# Patient Record
Sex: MALE | Race: BLACK OR AFRICAN AMERICAN | URBAN - METROPOLITAN AREA
[De-identification: names, ages, dates, MRNs, and addresses within clinical notes are randomized per-mention and may not be internally consistent; named-entity substitution may affect disease eponyms.]

---

## 2020-07-15 ENCOUNTER — APPOINTMENT (RX ONLY)
Dept: URBAN - METROPOLITAN AREA CLINIC 349 | Facility: CLINIC | Age: 35
Setting detail: DERMATOLOGY
End: 2020-07-15

## 2020-07-15 DIAGNOSIS — L70.0 ACNE VULGARIS: ICD-10-CM | Status: STABLE

## 2020-07-15 PROCEDURE — ? COUNSELING

## 2020-07-15 PROCEDURE — 99202 OFFICE O/P NEW SF 15 MIN: CPT

## 2020-07-15 PROCEDURE — ? PRESCRIPTION

## 2020-07-15 RX ORDER — ADAPALENE 3 MG/G
GEL TOPICAL
Qty: 1 | Refills: 5 | Status: ERX | COMMUNITY
Start: 2020-07-15

## 2020-07-15 RX ORDER — MINOCYCLINE 40 MG/G
AEROSOL, FOAM TOPICAL
Qty: 1 | Refills: 5 | Status: ERX | COMMUNITY
Start: 2020-07-15

## 2020-07-15 RX ADMIN — MINOCYCLINE: 40 AEROSOL, FOAM TOPICAL at 00:00

## 2020-07-15 RX ADMIN — ADAPALENE: 3 GEL TOPICAL at 00:00

## 2020-07-15 ASSESSMENT — LOCATION SIMPLE DESCRIPTION DERM
LOCATION SIMPLE: RIGHT CHEEK
LOCATION SIMPLE: LEFT CHEEK

## 2020-07-15 ASSESSMENT — LOCATION DETAILED DESCRIPTION DERM
LOCATION DETAILED: RIGHT INFERIOR CENTRAL MALAR CHEEK
LOCATION DETAILED: LEFT INFERIOR CENTRAL MALAR CHEEK

## 2020-07-15 ASSESSMENT — LOCATION ZONE DERM: LOCATION ZONE: FACE

## 2020-07-15 NOTE — PROCEDURE: COUNSELING
Include Pregnancy/Lactation Warning?: No
Minocycline Pregnancy And Lactation Text: This medication is Pregnancy Category D and not consider safe during pregnancy. It is also excreted in breast milk.
Spironolactone Counseling: Patient advised regarding risks of diarrhea, abdominal pain, hyperkalemia, birth defects (for female patients), liver toxicity and renal toxicity. The patient may need blood work to monitor liver and kidney function and potassium levels while on therapy. The patient verbalized understanding of the proper use and possible adverse effects of spironolactone.  All of the patient's questions and concerns were addressed.
Azithromycin Pregnancy And Lactation Text: This medication is considered safe during pregnancy and is also secreted in breast milk.
Isotretinoin Counseling: Patient should get monthly blood tests, not donate blood, not drive at night if vision affected, not share medication, and not undergo elective surgery for 6 months after tx completed. Side effects reviewed, pt to contact office should one occur.
Azithromycin Counseling:  I discussed with the patient the risks of azithromycin including but not limited to GI upset, allergic reaction, drug rash, diarrhea, and yeast infections.
Doxycycline Counseling:  Patient counseled regarding possible photosensitivity and increased risk for sunburn.  Patient instructed to avoid sunlight, if possible.  When exposed to sunlight, patients should wear protective clothing, sunglasses, and sunscreen.  The patient was instructed to call the office immediately if the following severe adverse effects occur:  hearing changes, easy bruising/bleeding, severe headache, or vision changes.  The patient verbalized understanding of the proper use and possible adverse effects of doxycycline.  All of the patient's questions and concerns were addressed.
Birth Control Pills Pregnancy And Lactation Text: This medication should be avoided if pregnant and for the first 30 days post-partum.
Topical Retinoid counseling:  Patient advised to apply a pea-sized amount only at bedtime and wait 30 minutes after washing their face before applying.  If too drying, patient may add a non-comedogenic moisturizer. The patient verbalized understanding of the proper use and possible adverse effects of retinoids.  All of the patient's questions and concerns were addressed.
Bactrim Pregnancy And Lactation Text: This medication is Pregnancy Category D and is known to cause fetal risk.  It is also excreted in breast milk.
Birth Control Pills Counseling: Birth Control Pill Counseling: I discussed with the patient the potential side effects of OCPs including but not limited to increased risk of stroke, heart attack, thrombophlebitis, deep venous thrombosis, hepatic adenomas, breast changes, GI upset, headaches, and depression.  The patient verbalized understanding of the proper use and possible adverse effects of OCPs. All of the patient's questions and concerns were addressed.
Benzoyl Peroxide Counseling: Patient counseled that medicine may cause skin irritation and bleach clothing.  In the event of skin irritation, the patient was advised to reduce the amount of the drug applied or use it less frequently.   The patient verbalized understanding of the proper use and possible adverse effects of benzoyl peroxide.  All of the patient's questions and concerns were addressed.
Bactrim Counseling:  I discussed with the patient the risks of sulfa antibiotics including but not limited to GI upset, allergic reaction, drug rash, diarrhea, dizziness, photosensitivity, and yeast infections.  Rarely, more serious reactions can occur including but not limited to aplastic anemia, agranulocytosis, methemoglobinemia, blood dyscrasias, liver or kidney failure, lung infiltrates or desquamative/blistering drug rashes.
Topical Clindamycin Pregnancy And Lactation Text: This medication is Pregnancy Category B and is considered safe during pregnancy. It is unknown if it is excreted in breast milk.
Minocycline Counseling: Patient advised regarding possible photosensitivity and discoloration of the teeth, skin, lips, tongue and gums.  Patient instructed to avoid sunlight, if possible.  When exposed to sunlight, patients should wear protective clothing, sunglasses, and sunscreen.  The patient was instructed to call the office immediately if the following severe adverse effects occur:  hearing changes, easy bruising/bleeding, severe headache, or vision changes.  The patient verbalized understanding of the proper use and possible adverse effects of minocycline.  All of the patient's questions and concerns were addressed.
Erythromycin Counseling:  I discussed with the patient the risks of erythromycin including but not limited to GI upset, allergic reaction, drug rash, diarrhea, increase in liver enzymes, and yeast infections.
Topical Sulfur Applications Pregnancy And Lactation Text: This medication is Pregnancy Category C and has an unknown safety profile during pregnancy. It is unknown if this topical medication is excreted in breast milk.
Tazorac Pregnancy And Lactation Text: This medication is not safe during pregnancy. It is unknown if this medication is excreted in breast milk.
Tazorac Counseling:  Patient advised that medication is irritating and drying.  Patient may need to apply sparingly and wash off after an hour before eventually leaving it on overnight.  The patient verbalized understanding of the proper use and possible adverse effects of tazorac.  All of the patient's questions and concerns were addressed.
Spironolactone Pregnancy And Lactation Text: This medication can cause feminization of the male fetus and should be avoided during pregnancy. The active metabolite is also found in breast milk.
Benzoyl Peroxide Pregnancy And Lactation Text: This medication is Pregnancy Category C. It is unknown if benzoyl peroxide is excreted in breast milk.
High Dose Vitamin A Pregnancy And Lactation Text: High dose vitamin A therapy is contraindicated during pregnancy and breast feeding.
Sarecycline Counseling: Patient advised regarding possible photosensitivity and discoloration of the teeth, skin, lips, tongue and gums.  Patient instructed to avoid sunlight, if possible.  When exposed to sunlight, patients should wear protective clothing, sunglasses, and sunscreen.  The patient was instructed to call the office immediately if the following severe adverse effects occur:  hearing changes, easy bruising/bleeding, severe headache, or vision changes.  The patient verbalized understanding of the proper use and possible adverse effects of sarecycline.  All of the patient's questions and concerns were addressed.
Doxycycline Pregnancy And Lactation Text: This medication is Pregnancy Category D and not consider safe during pregnancy. It is also excreted in breast milk but is considered safe for shorter treatment courses.
High Dose Vitamin A Counseling: Side effects reviewed, pt to contact office should one occur.
Dapsone Pregnancy And Lactation Text: This medication is Pregnancy Category C and is not considered safe during pregnancy or breast feeding.
Erythromycin Pregnancy And Lactation Text: This medication is Pregnancy Category B and is considered safe during pregnancy. It is also excreted in breast milk.
Topical Clindamycin Counseling: Patient counseled that this medication may cause skin irritation or allergic reactions.  In the event of skin irritation, the patient was advised to reduce the amount of the drug applied or use it less frequently.   The patient verbalized understanding of the proper use and possible adverse effects of clindamycin.  All of the patient's questions and concerns were addressed.
Topical Retinoid Pregnancy And Lactation Text: This medication is Pregnancy Category C. It is unknown if this medication is excreted in breast milk.
Detail Level: Zone
Topical Sulfur Applications Counseling: Topical Sulfur Counseling: Patient counseled that this medication may cause skin irritation or allergic reactions.  In the event of skin irritation, the patient was advised to reduce the amount of the drug applied or use it less frequently.   The patient verbalized understanding of the proper use and possible adverse effects of topical sulfur application.  All of the patient's questions and concerns were addressed.
Isotretinoin Pregnancy And Lactation Text: This medication is Pregnancy Category X and is considered extremely dangerous during pregnancy. It is unknown if it is excreted in breast milk.
Tetracycline Counseling: Patient counseled regarding possible photosensitivity and increased risk for sunburn.  Patient instructed to avoid sunlight, if possible.  When exposed to sunlight, patients should wear protective clothing, sunglasses, and sunscreen.  The patient was instructed to call the office immediately if the following severe adverse effects occur:  hearing changes, easy bruising/bleeding, severe headache, or vision changes.  The patient verbalized understanding of the proper use and possible adverse effects of tetracycline.  All of the patient's questions and concerns were addressed. Patient understands to avoid pregnancy while on therapy due to potential birth defects.
Dapsone Counseling: I discussed with the patient the risks of dapsone including but not limited to hemolytic anemia, agranulocytosis, rashes, methemoglobinemia, kidney failure, peripheral neuropathy, headaches, GI upset, and liver toxicity.  Patients who start dapsone require monitoring including baseline LFTs and weekly CBCs for the first month, then every month thereafter.  The patient verbalized understanding of the proper use and possible adverse effects of dapsone.  All of the patient's questions and concerns were addressed.

## 2022-03-18 PROBLEM — R73.01 IMPAIRED FASTING GLUCOSE: Status: ACTIVE | Noted: 2021-03-25

## 2022-03-18 PROBLEM — J35.1 ENLARGED TONSILS: Status: ACTIVE | Noted: 2017-11-10

## 2022-03-18 PROBLEM — J30.9 ACUTE ALLERGIC RHINITIS: Status: ACTIVE | Noted: 2017-11-10

## 2022-03-18 PROBLEM — I34.0 TRACE MITRAL REGURGITATION BY PRIOR ECHOCARDIOGRAM: Status: ACTIVE | Noted: 2018-03-27

## 2022-03-19 PROBLEM — R00.1 BRADYCARDIA, SINUS: Status: ACTIVE | Noted: 2017-11-10

## 2022-03-19 PROBLEM — R61 NIGHT SWEATS: Status: ACTIVE | Noted: 2017-11-10

## 2022-03-19 PROBLEM — I07.1 TRACE TRICUSPID REGURGITATION BY PRIOR ECHOCARDIOGRAM: Status: ACTIVE | Noted: 2018-03-27

## 2022-03-19 PROBLEM — L70.0 ACNE VULGARIS: Status: ACTIVE | Noted: 2020-08-03

## 2022-03-19 PROBLEM — R01.1 MURMUR, CARDIAC: Status: ACTIVE | Noted: 2018-01-23

## 2022-04-12 PROBLEM — R61 NIGHT SWEATS: Status: RESOLVED | Noted: 2017-11-10 | Resolved: 2022-04-12

## 2022-04-12 PROBLEM — R00.1 BRADYCARDIA, SINUS: Status: RESOLVED | Noted: 2017-11-10 | Resolved: 2022-04-12

## 2022-07-20 ENCOUNTER — NURSE TRIAGE (OUTPATIENT)
Dept: OTHER | Facility: CLINIC | Age: 37
End: 2022-07-20

## 2022-07-20 ENCOUNTER — TELEPHONE (OUTPATIENT)
Dept: INTERNAL MEDICINE CLINIC | Facility: CLINIC | Age: 37
End: 2022-07-20

## 2022-07-20 NOTE — TELEPHONE ENCOUNTER
Caller seen in [address] yesterday. Calling for a follow up. ECC agent Lobito still on the line and able to take the call back to schedule. Reason for Disposition   Caller has already spoken with another triager and has no further questions.     Protocols used: No Contact or Duplicate Contact Call-ADULT-

## 2022-07-20 NOTE — TELEPHONE ENCOUNTER
----- Message from Brain Larson sent at 7/20/2022  8:34 AM EDT -----  Subject: Message to Provider    QUESTIONS  Information for Provider? Patient passed out on 07/19/2022 and had to go   to the urgent care. The patient would like to do a follow up as soon as   possible. If you have any cancelations please call the patient to come.   ---------------------------------------------------------------------------  --------------  [id number] [employer]  [phone]; OK to leave message on voicemail  ---------------------------------------------------------------------------  --------------  SCRIPT ANSWERS  Relationship to Patient?  Self

## 2022-08-10 ENCOUNTER — OFFICE VISIT (OUTPATIENT)
Dept: INTERNAL MEDICINE CLINIC | Facility: CLINIC | Age: 37
End: 2022-08-10
Payer: COMMERCIAL

## 2022-08-10 VITALS
BODY MASS INDEX: 25.16 KG/M2 | OXYGEN SATURATION: 99 % | RESPIRATION RATE: 12 BRPM | DIASTOLIC BLOOD PRESSURE: 88 MMHG | HEART RATE: 58 BPM | WEIGHT: 166 LBS | HEIGHT: 68 IN | SYSTOLIC BLOOD PRESSURE: 138 MMHG

## 2022-08-10 DIAGNOSIS — R01.1 MURMUR, CARDIAC: ICD-10-CM

## 2022-08-10 DIAGNOSIS — R55 SYNCOPE, UNSPECIFIED SYNCOPE TYPE: Primary | ICD-10-CM

## 2022-08-10 DIAGNOSIS — I07.1 TRACE TRICUSPID REGURGITATION BY PRIOR ECHOCARDIOGRAM: ICD-10-CM

## 2022-08-10 DIAGNOSIS — R68.89 ALTERATION OF BODY TEMPERATURE: ICD-10-CM

## 2022-08-10 DIAGNOSIS — R35.0 URINARY FREQUENCY: ICD-10-CM

## 2022-08-10 DIAGNOSIS — R73.01 IMPAIRED FASTING GLUCOSE: ICD-10-CM

## 2022-08-10 DIAGNOSIS — R00.1 BRADYCARDIA: ICD-10-CM

## 2022-08-10 DIAGNOSIS — E55.9 VITAMIN D DEFICIENCY, UNSPECIFIED: ICD-10-CM

## 2022-08-10 DIAGNOSIS — G44.52 NPDH (NEW PERSISTENT DAILY HEADACHE): ICD-10-CM

## 2022-08-10 LAB
25(OH)D3 SERPL-MCNC: 63.6 NG/ML (ref 30–100)
ALBUMIN SERPL-MCNC: 3.9 G/DL (ref 3.5–5)
ALBUMIN/GLOB SERPL: 1.1 {RATIO} (ref 1.2–3.5)
ALP SERPL-CCNC: 79 U/L (ref 50–136)
ALT SERPL-CCNC: 32 U/L (ref 12–65)
ANION GAP SERPL CALC-SCNC: 4 MMOL/L (ref 7–16)
AST SERPL-CCNC: 12 U/L (ref 15–37)
BASOPHILS # BLD: 0.1 K/UL (ref 0–0.2)
BASOPHILS NFR BLD: 1 % (ref 0–2)
BILIRUB SERPL-MCNC: 0.3 MG/DL (ref 0.2–1.1)
BILIRUBIN, URINE, POC: NEGATIVE
BLOOD URINE, POC: NEGATIVE
BUN SERPL-MCNC: 17 MG/DL (ref 6–23)
CALCIUM SERPL-MCNC: 9.8 MG/DL (ref 8.3–10.4)
CHLORIDE SERPL-SCNC: 106 MMOL/L (ref 98–107)
CO2 SERPL-SCNC: 28 MMOL/L (ref 21–32)
CREAT SERPL-MCNC: 1.1 MG/DL (ref 0.8–1.5)
DIFFERENTIAL METHOD BLD: NORMAL
EOSINOPHIL # BLD: 0.2 K/UL (ref 0–0.8)
EOSINOPHIL NFR BLD: 3 % (ref 0.5–7.8)
ERYTHROCYTE [DISTWIDTH] IN BLOOD BY AUTOMATED COUNT: 12.9 % (ref 11.9–14.6)
EST. AVERAGE GLUCOSE BLD GHB EST-MCNC: 120 MG/DL
GLOBULIN SER CALC-MCNC: 3.5 G/DL (ref 2.3–3.5)
GLUCOSE SERPL-MCNC: 96 MG/DL (ref 65–100)
GLUCOSE URINE, POC: NEGATIVE
HBA1C MFR BLD: 5.8 % (ref 4.8–5.6)
HCT VFR BLD AUTO: 47.2 % (ref 41.1–50.3)
HGB BLD-MCNC: 15.1 G/DL (ref 13.6–17.2)
IMM GRANULOCYTES # BLD AUTO: 0 K/UL (ref 0–0.5)
IMM GRANULOCYTES NFR BLD AUTO: 0 % (ref 0–5)
KETONES, URINE, POC: NEGATIVE
LEUKOCYTE ESTERASE, URINE, POC: NEGATIVE
LYMPHOCYTES # BLD: 2.4 K/UL (ref 0.5–4.6)
LYMPHOCYTES NFR BLD: 36 % (ref 13–44)
MCH RBC QN AUTO: 30.1 PG (ref 26.1–32.9)
MCHC RBC AUTO-ENTMCNC: 32 G/DL (ref 31.4–35)
MCV RBC AUTO: 94 FL (ref 79.6–97.8)
MONOCYTES # BLD: 0.6 K/UL (ref 0.1–1.3)
MONOCYTES NFR BLD: 9 % (ref 4–12)
NEUTS SEG # BLD: 3.4 K/UL (ref 1.7–8.2)
NEUTS SEG NFR BLD: 51 % (ref 43–78)
NITRITE, URINE, POC: NEGATIVE
NRBC # BLD: 0 K/UL (ref 0–0.2)
PH, URINE, POC: 7 (ref 4.6–8)
PLATELET # BLD AUTO: 275 K/UL (ref 150–450)
PMV BLD AUTO: 10.3 FL (ref 9.4–12.3)
POTASSIUM SERPL-SCNC: 4.8 MMOL/L (ref 3.5–5.1)
PROT SERPL-MCNC: 7.4 G/DL (ref 6.3–8.2)
PROTEIN,URINE, POC: NEGATIVE
RBC # BLD AUTO: 5.02 M/UL (ref 4.23–5.6)
SODIUM SERPL-SCNC: 138 MMOL/L (ref 138–145)
SPECIFIC GRAVITY, URINE, POC: 1.01 (ref 1–1.03)
TSH, 3RD GENERATION: 0.76 UIU/ML (ref 0.36–3.74)
URINALYSIS CLARITY, POC: CLEAR
URINALYSIS COLOR, POC: YELLOW
UROBILINOGEN, POC: NORMAL
WBC # BLD AUTO: 6.8 K/UL (ref 4.3–11.1)

## 2022-08-10 PROCEDURE — 99214 OFFICE O/P EST MOD 30 MIN: CPT | Performed by: INTERNAL MEDICINE

## 2022-08-10 PROCEDURE — 93000 ELECTROCARDIOGRAM COMPLETE: CPT | Performed by: INTERNAL MEDICINE

## 2022-08-10 PROCEDURE — 81003 URINALYSIS AUTO W/O SCOPE: CPT | Performed by: INTERNAL MEDICINE

## 2022-08-10 ASSESSMENT — PATIENT HEALTH QUESTIONNAIRE - PHQ9
SUM OF ALL RESPONSES TO PHQ QUESTIONS 1-9: 0
2. FEELING DOWN, DEPRESSED OR HOPELESS: 0
SUM OF ALL RESPONSES TO PHQ QUESTIONS 1-9: 0
SUM OF ALL RESPONSES TO PHQ9 QUESTIONS 1 & 2: 0
SUM OF ALL RESPONSES TO PHQ QUESTIONS 1-9: 0
SUM OF ALL RESPONSES TO PHQ QUESTIONS 1-9: 0
1. LITTLE INTEREST OR PLEASURE IN DOING THINGS: 0

## 2022-08-10 NOTE — PROGRESS NOTES
8/10/2022    Chief Complaint   Patient presents with    Annual Exam    Follow-Up from Hospital     Was seen in hospital 7/19/22       Assessment and plan     1. Syncope, unspecified syncope type  -     EKG 12 Lead; Future  -     CBC with Auto Differential; Future  -     [facility]  -     Ambulatory referral to Cardiology  2. Impaired fasting glucose  -     Comprehensive Metabolic Panel; Future  -     CBC  -     Hemoglobin A1C  3. Murmur, cardiac  -     EKG 12 Lead; Future  -     CBC  -     Ambulatory referral to Cardiology  4. Trace tricuspid regurgitation by prior echocardiogram  -     EKG 12 Lead; Future  -     Ambulatory referral to Cardiology  5. NPD[facility] (new persistent daily headache)  -     MRI BRAIN W WO CONTRAST; Future  -     24 [facility] [address]  6. Alteration of body temperature  -     TSH; Future  7. Urinary frequency  -     AMB POC URINALYSIS DIP STICK AUTO W/O MICRO  8. Vitamin D deficiency, unspecified  -     Vitamin D 25 Hydroxy  9. Bradycardia  -     Ambulatory referral to Cardiology     Stable. He has not had any syncopal episodes since last.  Discussed current management. Continue with regular activities avoid straining lifting pushing or pulling. He is advised to seek medical attention for any recurrent symptoms. No neurologic deficits associated with headaches . Today's physical examination was normal.  He would need to follow-up with cardiologist.  Office will refer him again for evaluation for syncope. follow up   Return in about 5 weeks (around 9/14/2022) for Physical.         Subjective           HPI :    This is an urgent care  follow-up visit. Patient seen in the Urgent care at [facility] on July 19 after having an episode of syncope. He describes doing squats 150 pounds which is much less that he would usually use up felt lightheaded and blacked out for a few seconds. Episode was witnessed by other members of the gym.   He recovered spontaneously without any mental confusion or evidence of seizure activity. Since that time he has had no other episodes. No changes in energy level stamina. Denies having any chest pains or shortness of breath. Notes that his pulse is normal a bit higher than it used to be. Baseline pulse is in the 40s currently his pulse baseline is in the 50s. Records from [facility] reviewed. Headaches  For almost 1 month prior to his syncopal episode he says has been having dull headaches at the top of his head. Headaches are present almost all day. No known precipitating or relieving factors. No associated nausea visual changes, hearing changes, or  weakness numbness or tingling. Nonspecific symptoms  States that he thinks that he is unable to regulate his temperature. Describes having episodes of feeling hot and cold for no reason . Says that there has been changes in the regulation of his bowel movements and urination. Says he is to be like clockwork now had bowel movements and increased urination at different times. Thinks that he has retained water. Not sleeping as much as he used to. Appetite is good. Mood is good. Review of Systems  Occasional metallic taste    Objective     Examination  /88 (Site: Left Upper Arm, Position: Sitting)   Pulse 58   Resp 12   Ht 5' 8\" (1.727 m)   Wt 166 lb (75.3 kg)   SpO2 99%   BMI 25.24 kg/m²     Physical Exam  General appearance:Well looking , No distress Alert and oriented X 3. Well nourished and well hydrated  Head: Normocephalic, atraumatic. O scalp tenderness  Eyes: conjunctivae clear. Neck: Supple, No carotid bruit, no thyromegaly, no adenopathy  Resp: normal effort. Chest clear  Heart: heart sounds were very soft . Slow rate . Kandy Remedies Abdomen: Soft, non-tender, no masses , no organomegaly. Normal bowel sounds  Extremities: No edema  Neurology: no Focal deficits  Psychology: normal affect.  Mood is normal     EKG  Marked sinus  Bradycardia  rate 48 bpm MT interval 182. Normal Axis. NO ST- T abnormality   - occasional PAC     # PACs = 1.     Leslie Burton MD FACP

## 2022-08-12 ENCOUNTER — INITIAL CONSULT (OUTPATIENT)
Dept: CARDIOLOGY CLINIC | Age: 37
End: 2022-08-12
Payer: COMMERCIAL

## 2022-08-12 VITALS
HEIGHT: 68 IN | DIASTOLIC BLOOD PRESSURE: 80 MMHG | HEART RATE: 44 BPM | SYSTOLIC BLOOD PRESSURE: 120 MMHG | BODY MASS INDEX: 25.52 KG/M2 | WEIGHT: 168.4 LBS

## 2022-08-12 DIAGNOSIS — R55 VASOVAGAL SYNCOPE: Primary | ICD-10-CM

## 2022-08-12 DIAGNOSIS — R51.9 ACHING HEADACHE: ICD-10-CM

## 2022-08-12 PROCEDURE — 93000 ELECTROCARDIOGRAM COMPLETE: CPT | Performed by: INTERNAL MEDICINE

## 2022-08-12 PROCEDURE — 99204 OFFICE O/P NEW MOD 45 MIN: CPT | Performed by: INTERNAL MEDICINE

## 2022-08-12 ASSESSMENT — ENCOUNTER SYMPTOMS: SHORTNESS OF BREATH: 0

## 2022-08-12 NOTE — PROGRESS NOTES
[facility] CARDIOLOGY  [address], [address]  PHONE: [phone]      22    NAME:  Yas Mac  : 1985  MRN: [id number]         SUBJECTIVE:   Yas Mac is a 31 y.o. male seen for a consultation visit regarding the following:     Chief Complaint   Patient presents with    Consultation    Heart Murmur            HPI:  Consultation is requested by Marilia Eduardo MD for evaluation of Consultation and Heart Murmur   . Consult syncope. One and only episode . He got up at 430 to be at work at 65, had has usual breakfast and green tea, worked per usual, went to the gym ~ 4 that afternoon. Lifting much lighter weights as usual and doing lighter squats than usual during his warm up. Stood up, started to feel poorly, took about 5 steps and passed out. He did eat normally that day, high protein (lean) diet as usual, took his pre workout supplement which is the same he's been taking for a decade, no additional supplements and minimal caffeine, just a couple cups green tea in the morning. Very good about his water. Witnessed by 3 others, was out about 10 seconds, no loss of bowel or bladder control, went to urgent care where everything checked out ok. The following week, back to his usual routine, his 3rd workout that week he started to feel that way again after completing his exercise. Didn't pass out, just had to lie down for about 5 minutes and it resolved. The following week, he stopped his green tea altogether and resumed his workout without a problem. He has no symptoms between episodes and in fact is very healthy    His biological mother and oldest brother have hypertension and DM. He has an identical twin in the [employer] and they are both very healthy and athletic. Patient works as an .       He has been suffering with dull headaches for several months, initially attributed to allergies and started taking (plain) Allegra ~ 4 months ago. HA's have persisted and worsened and are now present 24/7, associated with feeling cold much of the time which is new for him. His PCP is working up his headaches. He saw Dr Abigail Prakash ~ 5 years ago with resting bradycardia in the high 30's, normal echo, asymptomatic and attributed to high vagal tone in this athlete. PAST CARDIAC HISTORY:  2018- Echo - normal wall thickness, SF, DF and valves          Past Medical History, Past Surgical History, Family history, Social History, and Medications were all reviewed with the patient today and updated as necessary. Prior to Admission medications    Medication Sig Start Date End Date Taking? Authorizing Provider   Multiple Vitamin (MULTIVITAMIN PO) Take by mouth   Yes Ar Automatic Reconciliation   olopatadine (PATADAY) 0.2 % SOLN ophthalmic solution Apply 1 drop to eye daily   Yes Ar Automatic Reconciliation     No Known Allergies  Past Medical History:   Diagnosis Date    Acute allergic rhinitis 11/10/2017    Bradycardia, sinus 11/10/2017    Asymptomatic     Glaucoma     bilateral    Impaired fasting glucose 3/25/2021    Murmur, cardiac 1/23/2018    Trace mitral regurgitation by prior echocardiogram 3/27/2018    Trace tricuspid regurgitation by prior echocardiogram 3/27/2018     Past Surgical History:   Procedure Laterality Date    REFRACTIVE SURGERY       Family History   Problem Relation Age of Onset    Diabetes Mother     Hypertension Mother      Social History     Tobacco Use    Smoking status: Never    Smokeless tobacco: Never   Substance Use Topics    Alcohol use: Yes       ROS:    Review of Systems   Cardiovascular:  Negative for chest pain. Respiratory:  Negative for shortness of breath. Neurological:  Positive for headaches. PHYSICAL EXAM:   /80   Pulse (!) 44   Ht 5' 8\" (1.727 m)   Wt 168 lb 6.4 oz (76.4 kg)   BMI 25.61 kg/m²      Physical Exam  Constitutional:       Appearance: Normal appearance. HENT:      Head: Normocephalic and atraumatic. Eyes:      Conjunctiva/sclera: Conjunctivae normal.   Neck:      Vascular: No carotid bruit. Cardiovascular:      Rate and Rhythm: Regular rhythm. Bradycardia present. Heart sounds: Murmur heard. Blowing holosystolic murmur is present with a grade of 2/6 at the lower left sternal border. The intensity does not change with respiration. No friction rub. No gallop. Pulmonary:      Effort: No respiratory distress. Breath sounds: No wheezing or rales. Abdominal:      General: Abdomen is flat. There is no distension. Palpations: Abdomen is soft. Tenderness: There is no abdominal tenderness. Musculoskeletal:         General: No swelling. Cervical back: Neck supple. Skin:     General: Skin is warm and dry. Neurological:      General: No focal deficit present. Mental Status: He is alert. Psychiatric:         Mood and Affect: Mood normal.         Behavior: Behavior normal.       Medical problems and test results were reviewed with the patient today.      DATA REVIEW  8/10/22 0929 2/17/22 0720 12/2/21 0709 7/9/21 0912 3/18/21 0902   Hemoglobin A1C 4.8 - 5.6 % 5.8 High        Lab Results   Component Value Date    WBC 6.8 08/10/2022    HGB 15.1 08/10/2022    HCT 47.2 08/10/2022    MCV 94.0 08/10/2022     08/10/2022     8/10/22 0929      TSH, 3RD GENERATION 0.358 - 3.740 uIU/mL 0.757      BMP  Lab Results   Component Value Date/Time     08/10/2022 09:29 AM    K 4.8 08/10/2022 09:29 AM     08/10/2022 09:29 AM    CO2 28 08/10/2022 09:29 AM    BUN 17 08/10/2022 09:29 AM    CREATININE 1.10 08/10/2022 09:29 AM    GLUCOSE 96 08/10/2022 09:29 AM    CALCIUM 9.8 08/10/2022 09:29 AM        LIPIDS  Lab Results   Component Value Date    CHOL 116 08/03/2020    CHOL 106 06/03/2019     Lab Results   Component Value Date    TRIG 77 08/03/2020    TRIG 43 06/03/2019     Lab Results   Component Value Date    HDL 43 08/03/2020 HDL 42 06/03/2019     Lab Results   Component Value Date    LDLCALC 58 08/03/2020    LDLCALC 55 06/03/2019     Lab Results   Component Value Date    LABVLDL 15 08/03/2020    LABVLDL 9 06/03/2019     No results found for: CHOLHDLRATIO    EKG    Marked sinus  Bradycardia 44  normal axis, intervals, ST and T waves    CXR/IMAGING        DEVICE INTERROGATION        OUTSIDE RECORDS REVIEW    Records from outside providers have been reviewed and summarized as noted in the HPI, past history and data review sections of this note, and reviewed with patient. .       ASSESSMENT and PLAN    Matthew Garcia was seen today for consultation and heart murmur. Diagnoses and all orders for this visit:    Vasovagal syncope  -     EKG 12 Lead    Aching headache        IMPRESSION:    Patient suffered episode consistent with hypotension/vagal stimulation. He operates with high vagal tone at baseline d/t his athletic status and has no reserve on which to call when vagus is stimulated. He started with headaches a few months ago which have progressed and are being evaluated. Suspect these contributed to higher vagal tone. He's had no further symptoms since he stopped his previously fairly minimal caffeine intake and will remain off. Reviewed pathophysiology, avoidance of triggers and monitor symptoms. Recommended adding compression socks during workouts and perform isometric maneuvers/assume lying position if symptoms recur. Should he start to have more symptoms will return for monitor but has resting HR in the 30's for most of his adult life. Return in about 6 months (around 2/12/2023). Thank you for allowing me to participate in this patient's care. Please call or contact me if there are any questions or concerns regarding the above.       Will Guido MD  08/12/22  11:12 AM

## 2022-08-12 NOTE — PATIENT INSTRUCTIONS
Please visit [email or web address] for more information regarding cardiovascular disease prevention and treatment.

## 2022-08-26 ENCOUNTER — HOSPITAL ENCOUNTER (OUTPATIENT)
Dept: MRI IMAGING | Age: 37
Discharge: HOME | End: 2022-08-29
Payer: COMMERCIAL

## 2022-08-26 DIAGNOSIS — G44.52 NPDH (NEW PERSISTENT DAILY HEADACHE): ICD-10-CM

## 2022-08-26 PROCEDURE — 6360000004 HC RX CONTRAST MEDICATION: Performed by: INTERNAL MEDICINE

## 2022-08-26 PROCEDURE — 2580000003 HC RX 258: Performed by: INTERNAL MEDICINE

## 2022-08-26 PROCEDURE — 70553 MRI BRAIN STEM W/O & W/DYE: CPT

## 2022-08-26 PROCEDURE — A9579 GAD-BASE MR CONTRAST NOS,1ML: HCPCS | Performed by: INTERNAL MEDICINE

## 2022-08-26 RX ORDER — SODIUM CHLORIDE 0.9 % (FLUSH) 0.9 %
10 SYRINGE (ML) INJECTION AS NEEDED
Status: DISCONTINUED | OUTPATIENT
Start: 2022-08-26 | End: 2022-08-26

## 2022-08-26 RX ADMIN — GADOTERIDOL 15 ML: 279.3 INJECTION, SOLUTION INTRAVENOUS at 17:48

## 2022-08-26 RX ADMIN — SODIUM CHLORIDE, PRESERVATIVE FREE 10 ML: 5 INJECTION INTRAVENOUS at 17:48

## 2022-09-14 ENCOUNTER — OFFICE VISIT (OUTPATIENT)
Dept: INTERNAL MEDICINE CLINIC | Facility: CLINIC | Age: 37
End: 2022-09-14
Payer: COMMERCIAL

## 2022-09-14 VITALS
BODY MASS INDEX: 26.52 KG/M2 | OXYGEN SATURATION: 97 % | SYSTOLIC BLOOD PRESSURE: 130 MMHG | RESPIRATION RATE: 14 BRPM | HEIGHT: 68 IN | WEIGHT: 175 LBS | DIASTOLIC BLOOD PRESSURE: 76 MMHG | HEART RATE: 72 BPM

## 2022-09-14 DIAGNOSIS — Z00.00 ENCOUNTER FOR WELL ADULT EXAM WITHOUT ABNORMAL FINDINGS: Primary | ICD-10-CM

## 2022-09-14 DIAGNOSIS — R51.9 DAILY HEADACHE: ICD-10-CM

## 2022-09-14 DIAGNOSIS — Z13.9 SCREENING FOR CONDITION: ICD-10-CM

## 2022-09-14 PROCEDURE — 99395 PREV VISIT EST AGE 18-39: CPT | Performed by: INTERNAL MEDICINE

## 2022-09-14 RX ORDER — CEFDINIR 300 MG/1
CAPSULE ORAL
COMMUNITY
Start: 2022-09-10

## 2022-09-14 RX ORDER — METHYLPREDNISOLONE 4 MG/1
TABLET ORAL
COMMUNITY
Start: 2022-09-10

## 2022-09-14 RX ORDER — AZELASTINE HCL 205.5 UG/1
SPRAY NASAL
COMMUNITY
Start: 2022-09-10

## 2022-09-14 NOTE — PROGRESS NOTES
Well Adult Note  Name: Conrad Orta Date: 2022   MRN: [id number] Sex: Male   Age: 30 y.o. Ethnicity: Non- / Non    : 1985 Race: Beth Litter / African American      Dorina Spurling is here for well adult exam.  History:  Doing well. Says he still has daily headaches       Review of Systems  Nil significant     No Known Allergies      Prior to Visit Medications    Medication Sig Taking?  Authorizing Provider   azelastine HCl 0.15 % SOLN USE 2 SPRAY(S) NASAL TWICE DAILY X14 DAY(S) Yes Historical Provider, MD   cefdinir (OMNICEF) 300 MG capsule TAKE 1 CAPSULE BY MOUTH EVERY 12 HOURS FOR 10 DAYS Yes Historical Provider, MD   methylPREDNISolone (MEDROL DOSEPACK) 4 MG tablet TAKE 1 PACKET BY MOUTH ONCE AS DIRECTED ON PACKAGE LABELING Yes Historical Provider, MD   Multiple Vitamin (MULTIVITAMIN PO) Take by mouth Yes Ar Automatic Reconciliation   olopatadine (PATADAY) 0.2 % SOLN ophthalmic solution Apply 1 drop to eye daily Yes Ar Automatic Reconciliation         Past Medical History:   Diagnosis Date    Acute allergic rhinitis 11/10/2017    Bradycardia, sinus 11/10/2017    Asymptomatic     Glaucoma     bilateral    Impaired fasting glucose 3/25/2021    Murmur, cardiac 2018    Trace mitral regurgitation by prior echocardiogram 3/27/2018    Trace tricuspid regurgitation by prior echocardiogram 3/27/2018       Past Surgical History:   Procedure Laterality Date    REFRACTIVE SURGERY           Family History   Problem Relation Age of Onset    Diabetes Mother     Hypertension Mother        Social History     Tobacco Use    Smoking status: Never    Smokeless tobacco: Never   Substance Use Topics    Alcohol use: Yes    Drug use: No       Objective   /76 (Site: Left Upper Arm, Position: Sitting)   Pulse 72   Resp 14   Ht 5' 8\" (1.727 m)   Wt 175 lb (79.4 kg)   SpO2 97%   BMI 26.61 kg/m²   Wt Readings from Last 3 Encounters:   22 168 lb 6.4 oz (76.4 kg)   22 168 lb (76.2 kg)   08/10/22 166 lb (75.3 kg)       Physical Exam  General appearance:Well looking , no distress  Alert and oriented X 3. Well nourished and well hydrated  Head: Normocephalic, atraumatic  Neck: supple, no adenopathy, thyroid: not enlarged, no carotid bruit and no JVD  Eyes: conjunctivae clear. EOM's intact. Lungs: Good air entry bilaterally, chest clear   Heart: regular rate and rhythm, S1, S2 normal, no murmur, rub or gallop  Abdomen: soft, non-tender. Bowel sounds normal. No masses,  no organomegaly. No abdominal bruit   Male exam: no penile lesions or discharge, no testicular masses or tenderness, no hernias. Extremities: no edema  Pulses: 2+ and symmetric  Neurology: no focal deficit. Psychology: normal affect. Mood is normal      Assessment   Plan   1. Encounter for well adult exam without abnormal findings  2. Screening for condition  -     AMB POC URINALYSIS DIP STICK AUTO W/O MICRO  3. Daily headache  -     [address]     Generally doing well . Encouraged to maintain a healthy lifestyle  Keep well balanced diet rich in grains, fruits and vegetables, get at least 6-8 hrs of sleep a night. EXERCISE : As tolerated or at least 150 min /week of moderate intensity aerobic activity spread over more than 3 days, with not  more than 2 consecutive days without exercise  Immunizations discussed today .         Personalized Preventive Plan   Current Health Maintenance Status  Immunization History   Administered Date(s) Administered    COVID-19, J&J, (age 18y+), IM, 0.5 mL 03/26/2021        Health Maintenance   Topic Date Due    Varicella vaccine (1 of 2 - 2-dose childhood series) Never done    HIV screen  Never done    DTaP/Tdap/Td vaccine (1 - Tdap) Never done    Flu vaccine (1) Never done    A1C test (Diabetic or Prediabetic)  08/10/2023    Depression Screen  08/10/2023    COVID-19 Vaccine  Completed    Hepatitis C screen  Completed    Hepatitis A vaccine  Aged Out Hepatitis B vaccine  Aged Out    Hib vaccine  Aged Out    Meningococcal (ACWY) vaccine  Aged Out    Pneumococcal 0-64 years Vaccine  Aged Out     Recommendations for [employer] Due: see orders and patient instructions/AVS.    Return in about 6 months (around 3/14/2023) for Chronic visit follow up, Fasting labs one week prior.     Jose G Duke MD FACP

## 2022-09-14 NOTE — PATIENT INSTRUCTIONS
Well Visit, Ages 25 to 48: Care Instructions  Overview     Well visits can help you stay healthy. Your doctor has checked your overall health and may have suggested ways to take good care of yourself. Your doctor also may have recommended tests. At home, you can help prevent illness withhealthy eating, regular exercise, and other steps. Follow-up care is a key part of your treatment and safety. Be sure to make and go to all appointments, and call your doctor if you are having problems. It's also a good idea to know your test results and keep alist of the medicines you take. How can you care for yourself at home? Get screening tests that you and your doctor decide on. Screening helps find diseases before any symptoms appear. Eat healthy foods. Choose fruits, vegetables, whole grains, protein, and low-fat dairy foods. Limit fat, especially saturated fat. Reduce salt in your diet. Limit alcohol. If you are a man, have no more than 2 drinks a day or 14 drinks a week. If you are a woman, have no more than 1 drink a day or 7 drinks a week. Get at least 30 minutes of physical activity on most days of the week. Walking is a good choice. You also may want to do other activities, such as running, swimming, cycling, or playing tennis or team sports. Discuss any changes in your exercise program with your doctor. Reach and stay at a healthy weight. This will lower your risk for many problems, such as obesity, diabetes, heart disease, and high blood pressure. Do not smoke or allow others to smoke around you. If you need help quitting, talk to your doctor about stop-smoking programs and medicines. These can increase your chances of quitting for good. Care for your mental health. It is easy to get weighed down by worry and stress. Learn strategies to manage stress, like deep breathing and mindfulness, and stay connected with your family and community. If you find you often feel sad or hopeless, talk with your doctor. Treatment can help. Talk to your doctor about whether you have any risk factors for sexually transmitted infections (STIs). You can help prevent STIs if you wait to have sex with a new partner (or partners) until you've each been tested for STIs. It also helps if you use condoms (male or female condoms) and if you limit your sex partners to one person who only has sex with you. Vaccines are available for some STIs, such as HPV. Use birth control if it's important to you to prevent pregnancy. Talk with your doctor about the choices available and what might be best for you. If you think you may have a problem with alcohol or drug use, talk to your doctor. This includes prescription medicines (such as amphetamines and opioids) and illegal drugs (such as cocaine and methamphetamine). Your doctor can help you figure out what type of treatment is best for you. Protect your skin from too much sun. When you're outdoors from 10 a.m. to 4 p.m., stay in the shade or cover up with clothing and a hat with a wide brim. Wear sunglasses that block UV rays. Even when it's cloudy, put broad-spectrum sunscreen (SPF 30 or higher) on any exposed skin. See a dentist one or two times a year for checkups and to have your teeth cleaned. Wear a seat belt in the car. When should you call for help? Watch closely for changes in your health, and be sure to contact your doctor if you have any problems or symptoms that concern you. Where can you learn more? Go to https://ziggy.[facility]. org and sign in to your [employer] account. Enter P072 in the [facility] box to learn more about \"Well Visit, Ages 25 to 48: Care Instructions. \"     If you do not have an account, please click on the \"Sign Up Now\" link. Current as of: October 6, 2021               Content Version: 13.3  © 2006-2022 Healthwise, Incorporated. Care instructions adapted under license by [facility] [address]  If you have questions about a medical condition or this instruction, always ask your healthcare professional. [place] [address] any warranty or liability for your use of this information.

## 2022-11-30 ENCOUNTER — TELEMEDICINE (OUTPATIENT)
Dept: INTERNAL MEDICINE CLINIC | Facility: CLINIC | Age: 37
End: 2022-11-30
Payer: COMMERCIAL

## 2022-11-30 DIAGNOSIS — G44.52 NPDH (NEW PERSISTENT DAILY HEADACHE): Primary | ICD-10-CM

## 2022-11-30 PROCEDURE — 99442 PR PHYS/QHP TELEPHONE EVALUATION 11-20 MIN: CPT | Performed by: INTERNAL MEDICINE

## 2022-11-30 NOTE — PROGRESS NOTES
11/30/2022      Shahid Carter is a 30 y.o. male evaluated via audio-only technology on 11/30/2022      Headache ( more frequent, worsening at times ) and Immunizations (Recommendation for covid vaccine )  . Assessment & Plan:   [facility] (new persistent daily headache)  Comments:  becoming more frequent . No associated neurologic symptoms . Orders:  -     External Referral To Neurology    Discussed with patient today . NO neurologic symptoms, but more frequent . Unusual that it seems to go away with exercise. Last MRI scan done as evaluation for headache was unremarkable, but there was interference due to his braces. He was referred to Neurology with [facility], but appointment is not until March 2023- this is too far away . He wishes to see neurologist sooner. I would also like him to have a neurologic evaluation sooner :  He is referred to a different Neurologist. Additional Imaging studies will not be done at this time . He agrees. I am uncertain that headache is a result of Covid 19 vaccination . He may choose to wait until he sees neurologist . Meanwhile exercise precautions by wearing a mask in crowds or situations where risk of exposure to Covid 19  is high . OK to try Tylenol for headache . Call if new or worsening symptoms       Return for Scheduled appt. Follow up as needed for Headaches . Subjective:       Headache follow up   Follow up on headaches . Described as a dull pain at the back of his head . Headaches started earlier this year ( After having Covid vaccine?)  Headaches resent daily , but has been  coming and going more frequently recently. . In the mornng - 12 oclock mild headache. Then by evening it is worse. Headache is relieved by exercising . Denies vomiting or nausea. No vision changes or loss of balance . NO facial numbness or paresthesia . No new limb weakness or paresthesia.     He is asking about whether or not he should get covid vaccine again , since he thinks headaches may be related to previous vaccination     Vision   Now wearing glasses. He has a history of Glaucoma     MRI Result (most recent):  MRI BRAIN W WO CONTRAST 08/26/2022    Narrative  Exam: MRI BRAIN W WO CONTRAST on 8/28/2022 9:25 AM    Clinical History: The Male patient is 31years old presenting for chronic  headaches. Comparison:  none    Technique:   T2-weighted, T1-weighted, FLAIR, and diffusion-weighted transaxial  , T1 sagittal, and gradient echo coronal pulse sequences were initially  performed. Following  the administration of contrast, additional T1-weighted  transaxial and coronal sequences were performed. 22 mL of ProHance contrast was  administered intravenously. Findings:    Study is degraded by brace artifact particularly throughout the facial  structures and frontal lobes. Diffusion imaging is nondiagnostic. There are no areas of abnormal enhancement. There are no abnormal extra-axial  fluid collections. No evidence of mass or mass effect is seen. Expected flow  voids are maintained in the major intracranial vessels. The cerebellum and brainstem are unremarkable. There is no evidence of Chiari  malformation. The ventricular system and CSF containing spaces are unremarkable in appearance. Visualized extracranial soft tissues are unremarkable. The paranasal sinuses are well pneumatized and aerated. Impression  1. Study limited by brace artifact, however demonstrating no gross acute  abnormality. CPT Code:  62152    Review of Systems    Nil significant     Patient-Reported Vitals 11/30/2022   Patient-Reported Weight 165   Patient-Reported Height 5'8   Patient-Reported Pulse 65         No Physical exam: telephone encounter only     Pia Zhao was evaluated through a patient-initiated, synchronous (real-time) audio only encounter.  He (or guardian if applicable) is aware that it is a billable service, which includes applicable co-pays, with coverage as determined by his insurance carrier. This visit was conducted with the patient's (and/or Nathanael Bolivar guardian's) verbal consent. He has not had a related appointment within my department in the past 7 days or scheduled within the next 24 hours. The patient was located in a state where the provider was licensed to provide care. The patient was located at: Other: [place]  The provider was located at:  Facility (Appt Dept): [facility], [place] [address]    Total Time: minutes: 11-20 minutes    MD Emily Alvarado

## 2023-01-09 ENCOUNTER — TELEPHONE (OUTPATIENT)
Dept: INTERNAL MEDICINE CLINIC | Facility: CLINIC | Age: 38
End: 2023-01-09

## 2023-01-09 NOTE — TELEPHONE ENCOUNTER
Patient has follow-up appointment on 3/15/23. He would like to check testosterone levels. Can we schedule him a lab for that or does he need another visit? If lab only, please place order in chart and I'll call him.

## 2023-01-12 DIAGNOSIS — R73.01 IMPAIRED FASTING GLUCOSE: ICD-10-CM

## 2023-01-12 DIAGNOSIS — E55.9 VITAMIN D DEFICIENCY, UNSPECIFIED: ICD-10-CM

## 2023-01-12 DIAGNOSIS — R68.82 LOSS OF LIBIDO: ICD-10-CM

## 2023-01-12 DIAGNOSIS — R53.83 FATIGUE, UNSPECIFIED TYPE: Primary | ICD-10-CM

## 2023-01-19 DIAGNOSIS — R53.83 FATIGUE, UNSPECIFIED TYPE: ICD-10-CM

## 2023-01-19 DIAGNOSIS — E55.9 VITAMIN D DEFICIENCY, UNSPECIFIED: ICD-10-CM

## 2023-01-19 DIAGNOSIS — R68.82 LOSS OF LIBIDO: ICD-10-CM

## 2023-01-19 DIAGNOSIS — R73.01 IMPAIRED FASTING GLUCOSE: ICD-10-CM

## 2023-01-19 LAB
25(OH)D3 SERPL-MCNC: 63.9 NG/ML (ref 30–100)
ALBUMIN SERPL-MCNC: 4.1 G/DL (ref 3.5–5)
ALBUMIN/GLOB SERPL: 1.3 (ref 0.4–1.6)
ALP SERPL-CCNC: 81 U/L (ref 50–136)
ALT SERPL-CCNC: 48 U/L (ref 12–65)
ANION GAP SERPL CALC-SCNC: 8 MMOL/L (ref 2–11)
AST SERPL-CCNC: 23 U/L (ref 15–37)
BASOPHILS # BLD: 0.1 K/UL (ref 0–0.2)
BASOPHILS NFR BLD: 1 % (ref 0–2)
BILIRUB SERPL-MCNC: 0.6 MG/DL (ref 0.2–1.1)
BUN SERPL-MCNC: 21 MG/DL (ref 6–23)
CALCIUM SERPL-MCNC: 9.6 MG/DL (ref 8.3–10.4)
CHLORIDE SERPL-SCNC: 107 MMOL/L (ref 101–110)
CO2 SERPL-SCNC: 26 MMOL/L (ref 21–32)
CREAT SERPL-MCNC: 1.2 MG/DL (ref 0.8–1.5)
DIFFERENTIAL METHOD BLD: NORMAL
EOSINOPHIL # BLD: 0.1 K/UL (ref 0–0.8)
EOSINOPHIL NFR BLD: 2 % (ref 0.5–7.8)
ERYTHROCYTE [DISTWIDTH] IN BLOOD BY AUTOMATED COUNT: 12.7 % (ref 11.9–14.6)
EST. AVERAGE GLUCOSE BLD GHB EST-MCNC: 111 MG/DL
GLOBULIN SER CALC-MCNC: 3.1 G/DL (ref 2.8–4.5)
GLUCOSE SERPL-MCNC: 95 MG/DL (ref 65–100)
HBA1C MFR BLD: 5.5 % (ref 4.8–5.6)
HCT VFR BLD AUTO: 47.4 % (ref 41.1–50.3)
HGB BLD-MCNC: 15.8 G/DL (ref 13.6–17.2)
IMM GRANULOCYTES # BLD AUTO: 0 K/UL (ref 0–0.5)
IMM GRANULOCYTES NFR BLD AUTO: 0 % (ref 0–5)
LYMPHOCYTES # BLD: 2.3 K/UL (ref 0.5–4.6)
LYMPHOCYTES NFR BLD: 31 % (ref 13–44)
MCH RBC QN AUTO: 30.4 PG (ref 26.1–32.9)
MCHC RBC AUTO-ENTMCNC: 33.3 G/DL (ref 31.4–35)
MCV RBC AUTO: 91.3 FL (ref 82–102)
MONOCYTES # BLD: 0.6 K/UL (ref 0.1–1.3)
MONOCYTES NFR BLD: 8 % (ref 4–12)
NEUTS SEG # BLD: 4.3 K/UL (ref 1.7–8.2)
NEUTS SEG NFR BLD: 58 % (ref 43–78)
NRBC # BLD: 0 K/UL (ref 0–0.2)
PLATELET # BLD AUTO: 260 K/UL (ref 150–450)
PMV BLD AUTO: 10.4 FL (ref 9.4–12.3)
POTASSIUM SERPL-SCNC: 4.2 MMOL/L (ref 3.5–5.1)
PROT SERPL-MCNC: 7.2 G/DL (ref 6.3–8.2)
RBC # BLD AUTO: 5.19 M/UL (ref 4.23–5.6)
SODIUM SERPL-SCNC: 141 MMOL/L (ref 133–143)
VIT B12 SERPL-MCNC: 1110 PG/ML (ref 193–986)
WBC # BLD AUTO: 7.4 K/UL (ref 4.3–11.1)

## 2023-01-19 NOTE — PROGRESS NOTES
[facility] CARDIOLOGY  [address] [facility], [address]  PHONE: [phone]      23    NAME:  Raya Reyes  : 1985  MRN: [id number]         SUBJECTIVE:   Raya Reyes is a 30 y.o. male seen for a follow up visit regarding the following:     Chief Complaint   Patient presents with    Heart Murmur              HPI:  Follow up  Heart Murmur   . Follow up vagal syncope, high vagal tone in this athlete. He returns with complaints of palpitations, dyspnea going up stairs. He normally sees his resting HR 42-48 and now 48-55, normally runs 5 miles a week but now struggling to make it through his workout, always takes the stairs at work, now breathing heavier. No chest pain. Has continued to have some near syncope and had vagal syncope during blood draw yesterday, extensive blood testing negative. Denies fever, chills, cough, n,v,bowel habits change. PAST CARDIAC HISTORY:  2018- Echo - normal wall thickness, SF, DF and valves                Key CAD CHF Meds       Patient is on no cardiovascular meds. Key Antihyperglycemic Medications       Patient is on no antihyperglycemic meds. Past Medical History, Past Surgical History, Family history, Social History, and Medications were all reviewed with the patient today and updated as necessary. Prior to Admission medications    Medication Sig Start Date End Date Taking?  Authorizing Provider   Multiple Vitamin (MULTIVITAMIN PO) Take by mouth   Yes Ar Automatic Reconciliation     No Known Allergies  Past Medical History:   Diagnosis Date    Acute allergic rhinitis 11/10/2017    Bradycardia, sinus 11/10/2017    Asymptomatic     Glaucoma     bilateral    Impaired fasting glucose 3/25/2021    Murmur, cardiac 2018    Trace mitral regurgitation by prior echocardiogram 3/27/2018    Trace tricuspid regurgitation by prior echocardiogram 3/27/2018     Past Surgical History:   Procedure Laterality Date    REFRACTIVE SURGERY       Family History   Problem Relation Age of Onset    Diabetes Mother     Hypertension Mother      Social History     Tobacco Use    Smoking status: Never    Smokeless tobacco: Never   Substance Use Topics    Alcohol use: Yes       ROS:    Review of Systems   Constitutional: Negative for fever. HENT:  Negative for congestion. Cardiovascular:  Negative for chest pain. Respiratory:  Positive for shortness of breath. Negative for cough. Gastrointestinal:  Negative for abdominal pain, change in bowel habit, nausea and vomiting. Neurological:  Positive for light-headedness. PHYSICAL EXAM:   /80   Pulse (!) 48   Ht 5' 8\" (1.727 m)   Wt 170 lb 6.4 oz (77.3 kg)   BMI 25.91 kg/m²      Wt Readings from Last 3 Encounters:   01/20/23 170 lb 6.4 oz (77.3 kg)   09/14/22 175 lb (79.4 kg)   08/12/22 168 lb 6.4 oz (76.4 kg)     BP Readings from Last 3 Encounters:   01/20/23 130/80   09/14/22 130/76   08/12/22 120/80     Pulse Readings from Last 3 Encounters:   01/20/23 (!) 48   09/14/22 72   08/12/22 (!) 44           Physical Exam  Constitutional:       Appearance: Normal appearance. HENT:      Head: Normocephalic and atraumatic. Eyes:      Conjunctiva/sclera: Conjunctivae normal.   Neck:      Vascular: No carotid bruit. Cardiovascular:      Rate and Rhythm: Regular rhythm. Bradycardia present. Heart sounds: No murmur heard. No friction rub. No gallop. Pulmonary:      Effort: No respiratory distress. Breath sounds: No wheezing or rales. Musculoskeletal:         General: No swelling. Cervical back: Neck supple. Skin:     General: Skin is warm and dry. Neurological:      General: No focal deficit present. Mental Status: He is alert. Psychiatric:         Mood and Affect: Mood normal.         Behavior: Behavior normal.       Medical problems and test results were reviewed with the patient today.      DATA REVIEW    LIPID PANEL     Lab Results Component Value Date    CHOL 116 08/03/2020    CHOL 106 06/03/2019     Lab Results   Component Value Date    TRIG 77 08/03/2020    TRIG 43 06/03/2019     Lab Results   Component Value Date    HDL 43 08/03/2020    HDL 42 06/03/2019     Lab Results   Component Value Date    LDLCALC 58 08/03/2020    LDLCALC 55 06/03/2019     Lab Results   Component Value Date    LABVLDL 15 08/03/2020    LABVLDL 9 06/03/2019     No results found for: CHOLHDLRATIO    BMP  Lab Results   Component Value Date/Time     01/19/2023 07:40 AM    K 4.2 01/19/2023 07:40 AM     01/19/2023 07:40 AM    CO2 26 01/19/2023 07:40 AM    BUN 21 01/19/2023 07:40 AM    CREATININE 1.20 01/19/2023 07:40 AM    GLUCOSE 95 01/19/2023 07:40 AM    CALCIUM 9.6 01/19/2023 07:40 AM      Lab Results   Component Value Date    WBC 7.4 01/19/2023    HGB 15.8 01/19/2023    HCT 47.4 01/19/2023    MCV 91.3 01/19/2023     01/19/2023     Hemoglobin A1C   Date Value Ref Range Status   01/19/2023 5.5 4.8 - 5.6 % Final      1/19/23 0740 8/10/22 0929   Vit D, 25-Hydroxy 30.0 - 100.0 ng/mL 63.9         8/10/22 0929     TSH, 3RD GENERATION 0.358 - 3.740 uIU/mL 0.757       1/19/23 0740     Testosterone 264 - 916 ng/dL 371          CXR/IMAGING        DEVICE INTERROGATION        OUTSIDE RECORDS REVIEW    Records from outside providers have been reviewed and summarized as noted in the HPI, past history and data review sections of this note, and reviewed with patient. .       ASSESSMENT and PLAN    Lucy Irby was seen today for heart murmur. Diagnoses and all orders for this visit:    Vasovagal attack    Bradycardia  -     Extended cardiac holter monitor (48 hrs - 15 days); Future  -     Exercise stress test; Future        IMPRESSION:    Slowly progressive exertional dyspnea with baseline bradycardia, high vagal tone with vasovagal episodes, highly fit individual.  Normal echo recently.   Return for TMET and will have him wear 3 day holter to evaluate need for pacemaker. Again reviewed knowledge of vagal syncope, avoidance, notifying providers. Extensive serologic evaluation negative. Return for FOR ETT. Thank you for allowing me to participate in this patient's care. Please call or contact me if there are any questions or concerns regarding the above.       Paulino Odell MD  01/20/23  9:42 AM

## 2023-01-20 ENCOUNTER — OFFICE VISIT (OUTPATIENT)
Dept: CARDIOLOGY CLINIC | Age: 38
End: 2023-01-20
Payer: COMMERCIAL

## 2023-01-20 VITALS
HEART RATE: 48 BPM | HEIGHT: 68 IN | DIASTOLIC BLOOD PRESSURE: 80 MMHG | BODY MASS INDEX: 25.82 KG/M2 | WEIGHT: 170.4 LBS | SYSTOLIC BLOOD PRESSURE: 130 MMHG

## 2023-01-20 DIAGNOSIS — R00.1 BRADYCARDIA: ICD-10-CM

## 2023-01-20 DIAGNOSIS — R55 VASOVAGAL ATTACK: Primary | ICD-10-CM

## 2023-01-20 LAB — TESTOST SERPL-MCNC: 371 NG/DL (ref 264–916)

## 2023-01-20 PROCEDURE — 99214 OFFICE O/P EST MOD 30 MIN: CPT | Performed by: INTERNAL MEDICINE

## 2023-01-20 ASSESSMENT — ENCOUNTER SYMPTOMS
VOMITING: 0
SHORTNESS OF BREATH: 1
CHANGE IN BOWEL HABIT: 0
COUGH: 0
NAUSEA: 0
ABDOMINAL PAIN: 0

## 2023-01-30 ENCOUNTER — OFFICE VISIT (OUTPATIENT)
Dept: INTERNAL MEDICINE CLINIC | Facility: CLINIC | Age: 38
End: 2023-01-30
Payer: COMMERCIAL

## 2023-01-30 VITALS
WEIGHT: 176.5 LBS | HEIGHT: 68 IN | HEART RATE: 52 BPM | SYSTOLIC BLOOD PRESSURE: 120 MMHG | DIASTOLIC BLOOD PRESSURE: 74 MMHG | BODY MASS INDEX: 26.75 KG/M2 | OXYGEN SATURATION: 99 %

## 2023-01-30 DIAGNOSIS — R68.82 LOSS OF LIBIDO: ICD-10-CM

## 2023-01-30 DIAGNOSIS — E55.9 VITAMIN D DEFICIENCY, UNSPECIFIED: ICD-10-CM

## 2023-01-30 DIAGNOSIS — R53.83 FATIGUE, UNSPECIFIED TYPE: Primary | ICD-10-CM

## 2023-01-30 DIAGNOSIS — F39 UNSPECIFIED MOOD (AFFECTIVE) DISORDER (HCC): ICD-10-CM

## 2023-01-30 DIAGNOSIS — R73.01 IMPAIRED FASTING GLUCOSE: ICD-10-CM

## 2023-01-30 DIAGNOSIS — R40.0 DAYTIME SLEEPINESS: ICD-10-CM

## 2023-01-30 PROCEDURE — 99214 OFFICE O/P EST MOD 30 MIN: CPT | Performed by: INTERNAL MEDICINE

## 2023-01-30 ASSESSMENT — PATIENT HEALTH QUESTIONNAIRE - PHQ9
SUM OF ALL RESPONSES TO PHQ9 QUESTIONS 1 & 2: 0
SUM OF ALL RESPONSES TO PHQ QUESTIONS 1-9: 0
DEPRESSION UNABLE TO ASSESS: PT REFUSES
SUM OF ALL RESPONSES TO PHQ QUESTIONS 1-9: 0
1. LITTLE INTEREST OR PLEASURE IN DOING THINGS: 0
2. FEELING DOWN, DEPRESSED OR HOPELESS: 0

## 2023-01-30 NOTE — PROGRESS NOTES
1/30/2023    Chief Complaint   Patient presents with    Fatigue    Discuss Labs       Assessment and plan     1. Fatigue, unspecified type  -     [facility]  2. Daytime sleepiness  -     [facility]  3. Unspecified mood (affective) disorder ([address]  4. Loss of libido  5. Vitamin D deficiency, unspecified  6. Impaired fasting glucose  Comments:  Hemoglobin A1c is normal.  Continue dietary and exercise recommendations. Patient with issues with fatigue over the last few months. Today he describes marked changes in his sleep and admits to daytime sleepiness. Differential causes include mood disorder, sleep disorder. Tests results were reviewed today seem to be within normal limits he had some concern about his testosterone level he is reassured that his testosterone level is normal.  Following for his fatigue and lack of interest and loss of libido. Problem to him with respect to his relationship with his wife. Plan: He will keep his follow-up appointment with cardiologist for complete work-up. For sleep study  Will keep his appointment with behavioral therapist.  He is advised to discuss his twin's problems with bipolar disorder [is possible that he may also be bipolar.]   He will keep follow-up appointment with neurology. Follow up   Return in about 3 months (around 4/30/2023) for Chronic visit follow up, Fasting labs one week prior. Subjective               HPI:   Patient is here to follow up on Hypertension, Impaired Fasting glucose  and Hypercholesterolemia. Feeling well and  Has no complaints. No symptoms suggestive of Diabetes Mellitus and Cardiovascular disease. Fatigue   C/O fatigue : feels 'blah' - no energy to do anything . In the morning fatigue is worse. NO motivation to do anything . He feels as if he does not have 'a full tank' .  He says he would usually get up at 5:30 in the morning and start his day but since [place] time he has not been able to do that. Waking up tired. Struggles during the day . No energy to do anything . He is struggling with focusing on computer at work . He would work out frequently in the past recently he does not have energy to work out . He knows that he can fall asleep easily. He says he did fall asleep standing up. He needs to be constantly engaged to he will fall asleep. Currently nighttime sleep is 5 to 6 hours. Normal for him is about 7 hours. He has recently seen the cardiologist as part of his work-up for fatigue -bradycardic [chronic condition] cardiologist has planned other studies to continue her evaluation. [place] He describes having poor libido but is able to have an erection. Mood   He has a mood disorder which is undefined. He has a twin who has bipolar disorder. He says he is currently seeing therapist whom he was recently referred. .  Ongoing standard therapy. This diagnosed with seasonal depression. PHQ-9 Total Score: 0 (1/30/2023  8:55 AM)      Headaches   Continuous .  Saw neurology a few days ago - thinks it is mainly muscular headache - awaiting MRI scan     Review of Systems  Nil significant    Total testosterone: 371 - normal     Lab Results   Component Value Date    KCTUIBYG01 1110 (H) 01/19/2023     Lab Results   Component Value Date    WBC 7.4 01/19/2023    HGB 15.8 01/19/2023    HCT 47.4 01/19/2023    MCV 91.3 01/19/2023     01/19/2023     Lab Results   Component Value Date     01/19/2023    K 4.2 01/19/2023     01/19/2023    CO2 26 01/19/2023    BUN 21 01/19/2023    CREATININE 1.20 01/19/2023    GLUCOSE 95 01/19/2023    CALCIUM 9.6 01/19/2023    PROT 7.2 01/19/2023    LABALBU 4.1 01/19/2023    BILITOT 0.6 01/19/2023    ALKPHOS 81 01/19/2023    AST 23 01/19/2023    ALT 48 01/19/2023    LABGLOM >60 01/19/2023    GFRAA >60 08/10/2022    AGRATIO 2.0 02/17/2022    GLOB 3.1 01/19/2023     Lab Results   Component Value Date/Time VITD25 63.9 01/19/2023 07:40 AM        Objective     Examination  /74   Pulse 52   Ht 5' 8\" (1.727 m)   Wt 176 lb 8 oz (80.1 kg)   SpO2 99%   BMI 26.84 kg/m²       BP Readings from Last 3 Encounters:   01/30/23 120/74   01/20/23 130/80   09/14/22 130/76       Physical Exam  General appearance:Well looking , No distress Alert and oriented X 3. Well nourished and well hydrated  Head: Normocephalic, atraumatic  Eyes: conjunctivae clear. Neck: Supple, No carotid bruit, no thyromegaly, no adenopathy  Resp: normal effort. Chest clear  Heart: RRR. Normal heart sounds . Abdomen: Soft, non-tender, no masses , no organomegaly. Normal bowel sounds  Extremities: No edema  Neurology: no Focal deficits  Psychology: normal affect.  Mood is normal     MD Darlin Heredia

## 2023-02-14 ENCOUNTER — HOSPITAL ENCOUNTER (OUTPATIENT)
Dept: SLEEP MEDICINE | Age: 38
Discharge: HOME | End: 2023-02-17
Payer: COMMERCIAL

## 2023-02-14 PROCEDURE — 95810 POLYSOM 6/> YRS 4/> PARAM: CPT

## 2023-02-22 NOTE — PROGRESS NOTES
[facility] CARDIOLOGY  [facility], [address]  [facility], [address] [place] [address]  PHONE: [phone]      23    NAME:  Nicole Luna  : 1985  MRN: [id number]         SUBJECTIVE:   Nicole Luna is a 30 y.o. male seen for a follow up visit regarding the following:     Chief Complaint   Patient presents with    Bradycardia              HPI:  Follow up  Bradycardia   . Follow up vagal syncope, high vagal tone in this athlete. Returns for stress testing with ongoing dyspnea. 3 day monitor showed no indication for pacemaker and details are below. Today's test to evaluate chronotropic competence. PAST CARDIAC HISTORY:  2018- Echo - normal wall thickness, SF, DF and valves  2023       3 day monitor - NSR/SB, rates , average rate 55. Rare ectopic. No diary entries          Key CAD CHF Meds       Patient is on no cardiovascular meds. Key Antihyperglycemic Medications       Patient is on no antihyperglycemic meds. Past Medical History, Past Surgical History, Family history, Social History, and Medications were all reviewed with the patient today and updated as necessary. Prior to Admission medications    Medication Sig Start Date End Date Taking?  Authorizing Provider   Multiple Vitamin (MULTIVITAMIN PO) Take by mouth    Ar Automatic Reconciliation     No Known Allergies  Past Medical History:   Diagnosis Date    Acute allergic rhinitis 11/10/2017    Bradycardia, sinus 11/10/2017    Asymptomatic     Glaucoma     bilateral    Impaired fasting glucose 3/25/2021    Murmur, cardiac 2018    Trace mitral regurgitation by prior echocardiogram 3/27/2018    Trace tricuspid regurgitation by prior echocardiogram 3/27/2018     Past Surgical History:   Procedure Laterality Date    REFRACTIVE SURGERY       Family History   Problem Relation Age of Onset    Cancer Mother     High Blood Pressure Mother     Diabetes Mother     Hypertension Mother      Social History     Tobacco Use    Smoking status: Never    Smokeless tobacco: Never   Substance Use Topics    Alcohol use: Yes       ROS:    ROS       PHYSICAL EXAM:   There were no vitals taken for this visit. Wt Readings from Last 3 Encounters:   01/30/23 176 lb 8 oz (80.1 kg)   01/20/23 170 lb 6.4 oz (77.3 kg)   09/14/22 175 lb (79.4 kg)     BP Readings from Last 3 Encounters:   01/30/23 120/74   01/20/23 130/80   09/14/22 130/76     Pulse Readings from Last 3 Encounters:   01/30/23 52   01/20/23 (!) 48   09/14/22 72           Physical Exam    Medical problems and test results were reviewed with the patient today. DATA REVIEW    LIPID PANEL     Lab Results   Component Value Date    CHOL 116 08/03/2020    CHOL 106 06/03/2019     Lab Results   Component Value Date    TRIG 77 08/03/2020    TRIG 43 06/03/2019     Lab Results   Component Value Date    HDL 43 08/03/2020    HDL 42 06/03/2019     Lab Results   Component Value Date    LDLCALC 58 08/03/2020    LDLCALC 55 06/03/2019     Lab Results   Component Value Date    LABVLDL 15 08/03/2020    LABVLDL 9 06/03/2019     No results found for: CHOLHDLRATIO    BMP  Lab Results   Component Value Date/Time     01/19/2023 07:40 AM    K 4.2 01/19/2023 07:40 AM     01/19/2023 07:40 AM    CO2 26 01/19/2023 07:40 AM    BUN 21 01/19/2023 07:40 AM    CREATININE 1.20 01/19/2023 07:40 AM    GLUCOSE 95 01/19/2023 07:40 AM    CALCIUM 9.6 01/19/2023 07:40 AM          EKG        CXR/IMAGING        DEVICE INTERROGATION        OUTSIDE RECORDS REVIEW    Records from outside providers have been reviewed and summarized as noted in the HPI, past history and data review sections of this note, and reviewed with patient. .       ASSESSMENT and PLAN    Danya Hudson was seen today for bradycardia.     Diagnoses and all orders for this visit:    Bradycardia  -     Exercise stress test  -     Exercise cardiac stress test    Shortness of breath    Vasovagal attack        IMPRESSION:    Patient easily able to generate 80% heart rate indicating adequate chronotropic competence which was the purpose of the test.      Again reviewed vasovagal episodes with high resting parasympathetic tone, continue conservative management and active lifestyle. Return in about 1 year (around 2/23/2024). Thank you for allowing me to participate in this patient's care. Please call or contact me if there are any questions or concerns regarding the above.       Jeff Aguilera MD  02/23/23  3:18 PM

## 2023-02-23 ENCOUNTER — OFFICE VISIT (OUTPATIENT)
Dept: CARDIOLOGY CLINIC | Age: 38
End: 2023-02-23
Payer: COMMERCIAL

## 2023-02-23 DIAGNOSIS — R06.02 SHORTNESS OF BREATH: ICD-10-CM

## 2023-02-23 DIAGNOSIS — R55 VASOVAGAL ATTACK: ICD-10-CM

## 2023-02-23 DIAGNOSIS — R00.1 BRADYCARDIA: Primary | ICD-10-CM

## 2023-02-23 LAB
STRESS ANGINA INDEX: 0
STRESS BASELINE DIAS BP: 70 MMHG
STRESS BASELINE HR: 50 BPM
STRESS BASELINE ST DEPRESSION: 0 MM
STRESS BASELINE SYS BP: 124 MMHG
STRESS EXERCISE DUR MIN: 9 MIN
STRESS EXERCISE DUR SEC: 0 SEC
STRESS PEAK DIAS BP: 80 MMHG
STRESS PEAK SYS BP: 150 MMHG
STRESS PERCENT HR ACHIEVED: 81 %
STRESS POST PEAK HR: 150 BPM
STRESS RATE PRESSURE PRODUCT: NORMAL BPM*MMHG
STRESS SR DUKE TREADMILL SCORE: 9
STRESS ST DEPRESSION: 0 MM
STRESS STAGE 1 BP: NORMAL MMHG
STRESS STAGE 1 DURATION: 3 MIN:SEC
STRESS STAGE 1 HR: 100 BPM
STRESS STAGE 2 BP: NORMAL MMHG
STRESS STAGE 2 DURATION: 3 MIN:SEC
STRESS STAGE 2 HR: 121 BPM
STRESS STAGE 3 BP: NORMAL MMHG
STRESS STAGE 3 COMMENTS: NORMAL
STRESS STAGE 3 DURATION: 3 MIN:SEC
STRESS STAGE 3 HR: 150 BPM
STRESS STAGE RECOVERY 1 BP: NORMAL MMHG
STRESS STAGE RECOVERY 1 DURATION: 2 MIN:SEC
STRESS STAGE RECOVERY 1 HR: 95 BPM
STRESS STAGE RECOVERY 2 BP: NORMAL MMHG
STRESS STAGE RECOVERY 2 DURATION: 2 MIN:SEC
STRESS STAGE RECOVERY 2 HR: 85 BPM
STRESS TARGET HR: 185 BPM

## 2023-02-23 PROCEDURE — 93015 CV STRESS TEST SUPVJ I&R: CPT | Performed by: INTERNAL MEDICINE

## 2023-03-09 ENCOUNTER — OFFICE VISIT (OUTPATIENT)
Dept: NEUROLOGY | Age: 38
End: 2023-03-09
Payer: COMMERCIAL

## 2023-03-09 DIAGNOSIS — G44.229 CHRONIC TENSION-TYPE HEADACHE, NOT INTRACTABLE: Primary | ICD-10-CM

## 2023-03-09 PROCEDURE — 99203 OFFICE O/P NEW LOW 30 MIN: CPT | Performed by: PSYCHIATRY & NEUROLOGY

## 2023-03-09 RX ORDER — ZONISAMIDE 100 MG/1
CAPSULE ORAL
Qty: 30 CAPSULE | Refills: 9 | Status: SHIPPED | OUTPATIENT
Start: 2023-03-09

## 2023-03-09 ASSESSMENT — ENCOUNTER SYMPTOMS
ALLERGIC/IMMUNOLOGIC NEGATIVE: 1
EYES NEGATIVE: 1
GASTROINTESTINAL NEGATIVE: 1
RESPIRATORY NEGATIVE: 1

## 2023-03-09 NOTE — PROGRESS NOTES
[facility], [address] [place] [address], [address] [place] [address]  Phone: [phone] Fax [phone]  Dr. Chantal Dubon      3/9/2023  Gerard Valenzuela     Patient is referred by the following provider for consultation regarding as below:       I reviewed the available records and notes and have examined patient with the following findings:     Chief Complaint:  No chief complaint on file. HPI: This is a right handed 30 y.o.  male who is very pleasant very appropriate gentleman who unfortunately his had about a year long history of headaches that have continuously been plaguing him. He states that they are daily headaches all day every day he goes to sleep with them wakes up with them. They are mostly occipital headaches and described as a deep dull ache without nausea vomiting photophobia or phonophobia. He does typically try to relax during this to try to ease up on it. And he is only tried over-the-counter medications. The only trigger he can figure out is that cell phones or long computer time definitely aggravates the headaches. He has no significant family history of anything concerning. But he does have very low heart rate bradycardic at times. He does not have a pacemaker and he does have a cardiologist watching him closely. He also has a sleep study set up. He states he does snore occasionally. But he has a very narrow airway. Healing of the medicines he is tried is over-the-counter medications for the headaches. He is very stable gentleman. And no significant family history. IMAGING REVIEW:  I REVIEWED PERTINENT  IMAGES AND REPORTS WITH THE PATIENT PERSONALLY, DIRECTLY AND FULLY.      Past Medical History:  Past Medical History:   Diagnosis Date    Acute allergic rhinitis 11/10/2017    Bradycardia, sinus 11/10/2017    Asymptomatic     Glaucoma     bilateral    Impaired fasting glucose 3/25/2021    Murmur, cardiac 1/23/2018    Trace mitral regurgitation by prior echocardiogram 3/27/2018    Trace tricuspid regurgitation by prior echocardiogram 3/27/2018       Past Surgical History:  Past Surgical History:   Procedure Laterality Date    REFRACTIVE SURGERY         Social History:  Social History     Socioeconomic History    Marital status: Single     Spouse name: Not on file    Number of children: Not on file    Years of education: Not on file    Highest education level: Not on file   Occupational History    Not on file   Tobacco Use    Smoking status: Never    Smokeless tobacco: Never   Substance and Sexual Activity    Alcohol use: Yes    Drug use: No    Sexual activity: Not on file   Other Topics Concern    Not on file   Social History Narrative    Not on file     Social Determinants of Health     Financial Resource Strain: Not on file   Food Insecurity: Not on file   Transportation Needs: Not on file   Physical Activity: Not on file   Stress: Not on file   Social Connections: Not on file   Intimate Partner Violence: Not on file   Housing Stability: Not on file       Family History:   Family History   Problem Relation Age of Onset    Cancer Mother     High Blood Pressure Mother     Diabetes Mother     Hypertension Mother        Current Outpatient Medications on File Prior to Visit   Medication Sig Dispense Refill    Multiple Vitamin (MULTIVITAMIN PO) Take by mouth       No current facility-administered medications on file prior to visit. No Known Allergies    Review of Systems:  Review of Systems   Constitutional: Negative. HENT: Negative. Eyes: Negative. Respiratory: Negative. Cardiovascular: Negative. Gastrointestinal: Negative. Endocrine: Negative. Genitourinary: Negative. Musculoskeletal: Negative. Allergic/Immunologic: Negative. Neurological:  Positive for headaches. Hematological: Negative. Psychiatric/Behavioral: Negative. No flowsheet data found. No flowsheet data found.        There were no vitals filed for this visit.     Physical Exam  Constitutional:       Appearance: Normal appearance. HENT:      Head: Normocephalic and atraumatic. Eyes:      Extraocular Movements: Extraocular movements intact and EOM normal.      Pupils: Pupils are equal, round, and reactive to light. Cardiovascular:      Rate and Rhythm: Normal rate and regular rhythm. Pulses: Normal pulses. Pulmonary:      Effort: Pulmonary effort is normal.   Abdominal:      General: Abdomen is flat. Neurological:      Mental Status: He is alert and oriented to person, place, and time. Gait: Gait is intact. Deep Tendon Reflexes:      Reflex Scores:       Tricep reflexes are 1+ on the right side and 1+ on the left side. Bicep reflexes are 1+ on the right side and 1+ on the left side. Brachioradialis reflexes are 1+ on the right side and 1+ on the left side. Patellar reflexes are 1+ on the right side and 1+ on the left side. Achilles reflexes are 1+ on the right side and 1+ on the left side. Neurologic Exam     Mental Status   Oriented to person, place, and time. Attention: normal. Concentration: normal.   Level of consciousness: alert  Knowledge: good. Cranial Nerves     CN II   Visual fields full to confrontation. CN III, IV, VI   Pupils are equal, round, and reactive to light. Extraocular motions are normal.     CN VII   Facial expression full, symmetric.      Motor Exam   Right arm tone: normal  Left arm tone: normal  Right leg tone: normal  Left leg tone: normal    Gait, Coordination, and Reflexes     Gait  Gait: normal    Tremor   Resting tremor: absent  Intention tremor: absent  Action tremor: absent    Reflexes   Right brachioradialis: 1+  Left brachioradialis: 1+  Right biceps: 1+  Left biceps: 1+  Right triceps: 1+  Left triceps: 1+  Right patellar: 1+  Left patellar: 1+  Right achilles: 1+  Left achilles: 1+        Assessment   Assessment / Plan:    Diagnoses and all orders for this visit:    Chronic tension-type headache, not intractable these headaches are chronic daily headaches. They are typically occipital headaches. They are this deep dull achy headache that does not seem to ever go away. At this point organ to try him on zonisamide 100mg at bed time. The Diagnosis and differential diagnostic considerations, and Rx Tx were reviewed with the patient at length. No orders of the defined types were placed in this encounter. I have spent greater than 50% of visit discussing and counseling of patient  for treatment and diagnostic plan review. Total time30     . Notes: Patient is to continue all medications as directed by prescribing physicians. Continuations on today's visit are made based on the patient's report of current medications.              Dr. Bj Ross  Consultation Neurology, Neurodiagnostics and Neurotherapeutics  Neuroelectrophysiology, EEG, EMG  [facility] Neurology  [address] [place] [address], [address] [facility]  Phone:  [phone]  Fax:   [phone]

## 2023-04-24 DIAGNOSIS — R73.01 IMPAIRED FASTING GLUCOSE: ICD-10-CM

## 2023-04-24 DIAGNOSIS — E55.9 VITAMIN D DEFICIENCY, UNSPECIFIED: Primary | ICD-10-CM

## 2023-04-27 DIAGNOSIS — E55.9 VITAMIN D DEFICIENCY, UNSPECIFIED: ICD-10-CM

## 2023-04-27 DIAGNOSIS — R73.01 IMPAIRED FASTING GLUCOSE: ICD-10-CM

## 2023-04-27 LAB
25(OH)D3 SERPL-MCNC: 72.7 NG/ML (ref 30–100)
ALBUMIN SERPL-MCNC: 4.1 G/DL (ref 3.5–5)
ALBUMIN/GLOB SERPL: 1.4 (ref 0.4–1.6)
ALP SERPL-CCNC: 86 U/L (ref 50–136)
ALT SERPL-CCNC: 29 U/L (ref 12–65)
ANION GAP SERPL CALC-SCNC: 3 MMOL/L (ref 2–11)
AST SERPL-CCNC: 18 U/L (ref 15–37)
BASOPHILS # BLD: 0.1 K/UL (ref 0–0.2)
BASOPHILS NFR BLD: 1 % (ref 0–2)
BILIRUB SERPL-MCNC: 0.5 MG/DL (ref 0.2–1.1)
BUN SERPL-MCNC: 18 MG/DL (ref 6–23)
CALCIUM SERPL-MCNC: 9.8 MG/DL (ref 8.3–10.4)
CHLORIDE SERPL-SCNC: 110 MMOL/L (ref 101–110)
CO2 SERPL-SCNC: 25 MMOL/L (ref 21–32)
CREAT SERPL-MCNC: 1.2 MG/DL (ref 0.8–1.5)
DIFFERENTIAL METHOD BLD: NORMAL
EOSINOPHIL # BLD: 0.2 K/UL (ref 0–0.8)
EOSINOPHIL NFR BLD: 3 % (ref 0.5–7.8)
ERYTHROCYTE [DISTWIDTH] IN BLOOD BY AUTOMATED COUNT: 12.9 % (ref 11.9–14.6)
GLOBULIN SER CALC-MCNC: 3 G/DL (ref 2.8–4.5)
GLUCOSE SERPL-MCNC: 109 MG/DL (ref 65–100)
HCT VFR BLD AUTO: 48.4 % (ref 41.1–50.3)
HGB BLD-MCNC: 15.6 G/DL (ref 13.6–17.2)
IMM GRANULOCYTES # BLD AUTO: 0 K/UL (ref 0–0.5)
IMM GRANULOCYTES NFR BLD AUTO: 1 % (ref 0–5)
LYMPHOCYTES # BLD: 2.3 K/UL (ref 0.5–4.6)
LYMPHOCYTES NFR BLD: 33 % (ref 13–44)
MCH RBC QN AUTO: 29.7 PG (ref 26.1–32.9)
MCHC RBC AUTO-ENTMCNC: 32.2 G/DL (ref 31.4–35)
MCV RBC AUTO: 92.2 FL (ref 82–102)
MONOCYTES # BLD: 0.6 K/UL (ref 0.1–1.3)
MONOCYTES NFR BLD: 8 % (ref 4–12)
NEUTS SEG # BLD: 3.8 K/UL (ref 1.7–8.2)
NEUTS SEG NFR BLD: 54 % (ref 43–78)
NRBC # BLD: 0 K/UL (ref 0–0.2)
PLATELET # BLD AUTO: 263 K/UL (ref 150–450)
PMV BLD AUTO: 10.1 FL (ref 9.4–12.3)
POTASSIUM SERPL-SCNC: 4.7 MMOL/L (ref 3.5–5.1)
PROT SERPL-MCNC: 7.1 G/DL (ref 6.3–8.2)
RBC # BLD AUTO: 5.25 M/UL (ref 4.23–5.6)
SODIUM SERPL-SCNC: 138 MMOL/L (ref 133–143)
WBC # BLD AUTO: 7 K/UL (ref 4.3–11.1)

## 2023-04-28 LAB
EST. AVERAGE GLUCOSE BLD GHB EST-MCNC: 117 MG/DL
HBA1C MFR BLD: 5.7 % (ref 4.8–5.6)

## 2023-05-03 ENCOUNTER — OFFICE VISIT (OUTPATIENT)
Dept: INTERNAL MEDICINE CLINIC | Facility: CLINIC | Age: 38
End: 2023-05-03
Payer: COMMERCIAL

## 2023-05-03 VITALS
HEART RATE: 64 BPM | SYSTOLIC BLOOD PRESSURE: 124 MMHG | HEIGHT: 68 IN | WEIGHT: 173 LBS | OXYGEN SATURATION: 99 % | BODY MASS INDEX: 26.22 KG/M2 | DIASTOLIC BLOOD PRESSURE: 82 MMHG

## 2023-05-03 DIAGNOSIS — T78.3XXA ANGIOEDEMA, INITIAL ENCOUNTER: ICD-10-CM

## 2023-05-03 DIAGNOSIS — R40.0 DAYTIME SLEEPINESS: ICD-10-CM

## 2023-05-03 DIAGNOSIS — R73.01 IMPAIRED FASTING GLUCOSE: ICD-10-CM

## 2023-05-03 DIAGNOSIS — R53.83 FATIGUE, UNSPECIFIED TYPE: Primary | ICD-10-CM

## 2023-05-03 DIAGNOSIS — R09.89 TENDERNESS OF LYMPH NODE: ICD-10-CM

## 2023-05-03 DIAGNOSIS — L73.1 INGROWN HAIR: ICD-10-CM

## 2023-05-03 DIAGNOSIS — E55.9 VITAMIN D DEFICIENCY, UNSPECIFIED: ICD-10-CM

## 2023-05-03 PROCEDURE — 99214 OFFICE O/P EST MOD 30 MIN: CPT | Performed by: INTERNAL MEDICINE

## 2023-05-03 RX ORDER — EPINEPHRINE 0.3 MG/.3ML
INJECTION SUBCUTANEOUS
Qty: 2 EACH | Refills: 0 | Status: SHIPPED | OUTPATIENT
Start: 2023-05-03

## 2023-05-03 SDOH — ECONOMIC STABILITY: HOUSING INSECURITY
IN THE LAST 12 MONTHS, WAS THERE A TIME WHEN YOU DID NOT HAVE A STEADY PLACE TO SLEEP OR SLEPT IN A SHELTER (INCLUDING NOW)?: NO

## 2023-05-03 SDOH — ECONOMIC STABILITY: INCOME INSECURITY: HOW HARD IS IT FOR YOU TO PAY FOR THE VERY BASICS LIKE FOOD, HOUSING, MEDICAL CARE, AND HEATING?: NOT VERY HARD

## 2023-05-03 SDOH — ECONOMIC STABILITY: FOOD INSECURITY: WITHIN THE PAST 12 MONTHS, THE FOOD YOU BOUGHT JUST DIDN'T LAST AND YOU DIDN'T HAVE MONEY TO GET MORE.: NEVER TRUE

## 2023-05-03 SDOH — ECONOMIC STABILITY: FOOD INSECURITY: WITHIN THE PAST 12 MONTHS, YOU WORRIED THAT YOUR FOOD WOULD RUN OUT BEFORE YOU GOT MONEY TO BUY MORE.: NEVER TRUE

## 2023-05-03 ASSESSMENT — PATIENT HEALTH QUESTIONNAIRE - PHQ9
SUM OF ALL RESPONSES TO PHQ QUESTIONS 1-9: 0
1. LITTLE INTEREST OR PLEASURE IN DOING THINGS: 0
SUM OF ALL RESPONSES TO PHQ9 QUESTIONS 1 & 2: 0
SUM OF ALL RESPONSES TO PHQ QUESTIONS 1-9: 0
SUM OF ALL RESPONSES TO PHQ QUESTIONS 1-9: 0
2. FEELING DOWN, DEPRESSED OR HOPELESS: 0
SUM OF ALL RESPONSES TO PHQ QUESTIONS 1-9: 0

## 2023-05-03 NOTE — PROGRESS NOTES
5/3/2023    Chief Complaint   Patient presents with    Follow-up     On existing conditions     Other     Pt states he has a knot that came up recently in the right side of the back of his neck        Assessment and plan     1. Fatigue, unspecified type  Comments:  Improved  2. Daytime sleepiness  Comments:  Improved  3. Vitamin D deficiency, unspecified  Comments:  Vitamin D is now within normal range. He can use a lower dose of vitamin D daily. 4. Impaired fasting glucose  5. Angioedema, initial encounter  Comments:  unknown trigger . Discussed avoidance of potential triggers. Keep Benadryl on hand and use as needed. He is given a prescription for EpiPen and instructions   Orders:  -     EPINEPHrine (EPIPEN 2-RIVER) 0.3 MG/0.3ML SOAJ injection; Use as directed for allergic reaction/angioedema, Disp-2 each, R-0Normal  -     AFL - [facility]  6. Ingrown hair  Comments:  Present along the hairline in the right occipital area. Causing underlying skin to be slightly swollen and tender. No signs of infection. 7. Tenderness of lymph node  Comments:  seems to be reactionary lymphnode in vicinity of infllammed hair follicle. for observation        Generally doing well. Recent test results discussed today. He is encouraged to continue following his exercise routine and monitoring his diet. Okay to hold vitamin D and use a lower dose. Follow up   Return in about 20 weeks (around 9/20/2023) for Physical.         Subjective           HPI:   Patient is here to follow up on Impaired Fasting glucose   Feeling well and  Has no complaints.  No symptoms suggestive of Diabetes Mellitus    Hemoglobin A1C   Date Value Ref Range Status   04/27/2023 5.7 (H) 4.8 - 5.6 % Final          Lab Results   Component Value Date     04/27/2023    K 4.7 04/27/2023     04/27/2023    CO2 25 04/27/2023    BUN 18 04/27/2023    CREATININE 1.20 04/27/2023    GLUCOSE 109 (H) 04/27/2023    CALCIUM 9.8 04/27/2023

## 2023-06-27 ENCOUNTER — APPOINTMENT (RX ONLY)
Dept: URBAN - METROPOLITAN AREA CLINIC 329 | Facility: CLINIC | Age: 38
Setting detail: DERMATOLOGY
End: 2023-06-27

## 2023-06-27 DIAGNOSIS — L82.1 OTHER SEBORRHEIC KERATOSIS: ICD-10-CM

## 2023-06-27 DIAGNOSIS — L259 CONTACT DERMATITIS AND OTHER ECZEMA, UNSPECIFIED CAUSE: ICD-10-CM | Status: INADEQUATELY CONTROLLED

## 2023-06-27 DIAGNOSIS — D22 MELANOCYTIC NEVI: ICD-10-CM

## 2023-06-27 PROBLEM — D23.72 OTHER BENIGN NEOPLASM OF SKIN OF LEFT LOWER LIMB, INCLUDING HIP: Status: ACTIVE | Noted: 2023-06-27

## 2023-06-27 PROBLEM — L23.9 ALLERGIC CONTACT DERMATITIS, UNSPECIFIED CAUSE: Status: ACTIVE | Noted: 2023-06-27

## 2023-06-27 PROBLEM — D23.71 OTHER BENIGN NEOPLASM OF SKIN OF RIGHT LOWER LIMB, INCLUDING HIP: Status: ACTIVE | Noted: 2023-06-27

## 2023-06-27 PROBLEM — D22.5 MELANOCYTIC NEVI OF TRUNK: Status: ACTIVE | Noted: 2023-06-27

## 2023-06-27 PROCEDURE — ? TREATMENT REGIMEN

## 2023-06-27 PROCEDURE — ? COUNSELING

## 2023-06-27 PROCEDURE — ? PRESCRIPTION MEDICATION MANAGEMENT

## 2023-06-27 PROCEDURE — ? PRESCRIPTION

## 2023-06-27 PROCEDURE — ? FULL BODY SKIN EXAM

## 2023-06-27 PROCEDURE — 99213 OFFICE O/P EST LOW 20 MIN: CPT

## 2023-06-27 RX ORDER — FLUTICASONE PROPIONATE 0.05 MG/G
OINTMENT TOPICAL
Qty: 60 | Refills: 3 | Status: ERX | COMMUNITY
Start: 2023-06-27

## 2023-06-27 RX ADMIN — FLUTICASONE PROPIONATE: 0.05 OINTMENT TOPICAL at 00:00

## 2023-06-27 ASSESSMENT — LOCATION SIMPLE DESCRIPTION DERM
LOCATION SIMPLE: SCALP
LOCATION SIMPLE: LEFT PRETIBIAL REGION
LOCATION SIMPLE: RIGHT UPPER BACK
LOCATION SIMPLE: RIGHT PRETIBIAL REGION

## 2023-06-27 ASSESSMENT — LOCATION DETAILED DESCRIPTION DERM
LOCATION DETAILED: RIGHT CENTRAL FRONTAL SCALP
LOCATION DETAILED: LEFT PROXIMAL PRETIBIAL REGION
LOCATION DETAILED: RIGHT MID-UPPER BACK
LOCATION DETAILED: RIGHT PROXIMAL PRETIBIAL REGION

## 2023-06-27 ASSESSMENT — LOCATION ZONE DERM
LOCATION ZONE: TRUNK
LOCATION ZONE: LEG
LOCATION ZONE: SCALP

## 2023-06-27 NOTE — PROCEDURE: PRESCRIPTION MEDICATION MANAGEMENT
Detail Level: Zone
Initiate Treatment: Fluticasone propionate 0.005 % topical ointment	- Apply to affected areas bid for 2 weeks then when flared
Render In Strict Bullet Format?: No

## 2023-09-18 ENCOUNTER — OFFICE VISIT (OUTPATIENT)
Dept: INTERNAL MEDICINE CLINIC | Facility: CLINIC | Age: 38
End: 2023-09-18
Payer: COMMERCIAL

## 2023-09-18 VITALS
BODY MASS INDEX: 26.45 KG/M2 | DIASTOLIC BLOOD PRESSURE: 70 MMHG | WEIGHT: 174.5 LBS | HEART RATE: 45 BPM | OXYGEN SATURATION: 99 % | HEIGHT: 68 IN | RESPIRATION RATE: 18 BRPM | SYSTOLIC BLOOD PRESSURE: 118 MMHG | TEMPERATURE: 97.5 F

## 2023-09-18 DIAGNOSIS — Z00.00 ENCOUNTER FOR WELL ADULT EXAM WITHOUT ABNORMAL FINDINGS: Primary | ICD-10-CM

## 2023-09-18 DIAGNOSIS — T78.3XXA ANGIOEDEMA, INITIAL ENCOUNTER: ICD-10-CM

## 2023-09-18 PROCEDURE — 99395 PREV VISIT EST AGE 18-39: CPT | Performed by: INTERNAL MEDICINE

## 2023-09-18 RX ORDER — ZONISAMIDE 100 MG/1
100 CAPSULE ORAL DAILY
COMMUNITY

## 2023-09-18 RX ORDER — FLUTICASONE PROPIONATE 0.05 MG/G
OINTMENT TOPICAL
COMMUNITY
Start: 2023-07-23

## 2023-09-18 ASSESSMENT — PATIENT HEALTH QUESTIONNAIRE - PHQ9
SUM OF ALL RESPONSES TO PHQ9 QUESTIONS 1 & 2: 0
SUM OF ALL RESPONSES TO PHQ QUESTIONS 1-9: 0
SUM OF ALL RESPONSES TO PHQ QUESTIONS 1-9: 0
1. LITTLE INTEREST OR PLEASURE IN DOING THINGS: 0
SUM OF ALL RESPONSES TO PHQ QUESTIONS 1-9: 0
1. LITTLE INTEREST OR PLEASURE IN DOING THINGS: NOT AT ALL
SUM OF ALL RESPONSES TO PHQ9 QUESTIONS 1 & 2: 0
SUM OF ALL RESPONSES TO PHQ QUESTIONS 1-9: 0
2. FEELING DOWN, DEPRESSED OR HOPELESS: 0
2. FEELING DOWN, DEPRESSED OR HOPELESS: NOT AT ALL

## 2023-09-22 RX ORDER — EPINEPHRINE 0.3 MG/.3ML
INJECTION SUBCUTANEOUS
Qty: 2 EACH | Refills: 0 | Status: SHIPPED | OUTPATIENT
Start: 2023-09-22

## 2023-12-14 DIAGNOSIS — R73.01 IMPAIRED FASTING GLUCOSE: ICD-10-CM

## 2023-12-14 DIAGNOSIS — E55.9 VITAMIN D DEFICIENCY, UNSPECIFIED: Primary | ICD-10-CM

## 2023-12-20 ENCOUNTER — TELEPHONE (OUTPATIENT)
Dept: INTERNAL MEDICINE CLINIC | Facility: CLINIC | Age: 38
End: 2023-12-20

## 2023-12-20 NOTE — TELEPHONE ENCOUNTER
----- Message from Kathy Kala sent at 12/20/2023 10:15 AM EST -----  Subject: Message to Provider    QUESTIONS  Information for Provider? patient is needing labs to be rescheduled for   about a week before their 1/23/24 appointment for their Followup. they   rescheduled it from 12/27 please call patient back with new appointment   time for labs  ---------------------------------------------------------------------------  --------------  CALL BACK INFO  [phone]; OK to leave message on voicemail  ---------------------------------------------------------------------------  --------------  SCRIPT ANSWERS  Relationship to Patient? Self

## 2023-12-20 NOTE — TELEPHONE ENCOUNTER
----- Message from Kathy Kala sent at 12/20/2023 10:15 AM EST -----  Subject: Message to Provider    QUESTIONS  Information for Provider? patient is needing labs to be rescheduled for   about a week before their 1/23/24 appointment for their Followup. they   rescheduled it from 12/27 please call patient back with new appointment   time for labs  ---------------------------------------------------------------------------  --------------  CALL BACK INFO  [phone]; OK to leave message on voicemail  ---------------------------------------------------------------------------  --------------  SCRIPT ANSWERS  Relationship to Patient? Self

## 2024-01-16 DIAGNOSIS — R73.01 IMPAIRED FASTING GLUCOSE: ICD-10-CM

## 2024-01-16 DIAGNOSIS — E55.9 VITAMIN D DEFICIENCY, UNSPECIFIED: ICD-10-CM

## 2024-01-16 LAB
25(OH)D3 SERPL-MCNC: 67.2 NG/ML (ref 30–100)
ALBUMIN SERPL-MCNC: 4 G/DL (ref 3.5–5)
ALBUMIN/GLOB SERPL: 1.3 (ref 0.4–1.6)
ALP SERPL-CCNC: 69 U/L (ref 50–136)
ALT SERPL-CCNC: 33 U/L (ref 12–65)
ANION GAP SERPL CALC-SCNC: 7 MMOL/L (ref 2–11)
AST SERPL-CCNC: 17 U/L (ref 15–37)
BASOPHILS # BLD: 0.1 K/UL (ref 0–0.2)
BASOPHILS NFR BLD: 1 % (ref 0–2)
BILIRUB SERPL-MCNC: 0.5 MG/DL (ref 0.2–1.1)
BUN SERPL-MCNC: 25 MG/DL (ref 6–23)
CALCIUM SERPL-MCNC: 9.8 MG/DL (ref 8.3–10.4)
CHLORIDE SERPL-SCNC: 105 MMOL/L (ref 103–113)
CO2 SERPL-SCNC: 26 MMOL/L (ref 21–32)
CREAT SERPL-MCNC: 1.3 MG/DL (ref 0.8–1.5)
DIFFERENTIAL METHOD BLD: NORMAL
EOSINOPHIL # BLD: 0.2 K/UL (ref 0–0.8)
EOSINOPHIL NFR BLD: 4 % (ref 0.5–7.8)
ERYTHROCYTE [DISTWIDTH] IN BLOOD BY AUTOMATED COUNT: 13 % (ref 11.9–14.6)
EST. AVERAGE GLUCOSE BLD GHB EST-MCNC: 120 MG/DL
GLOBULIN SER CALC-MCNC: 3.2 G/DL (ref 2.8–4.5)
GLUCOSE SERPL-MCNC: 90 MG/DL (ref 65–100)
HBA1C MFR BLD: 5.8 % (ref 4.8–5.6)
HCT VFR BLD AUTO: 45 % (ref 41.1–50.3)
HGB BLD-MCNC: 14.8 G/DL (ref 13.6–17.2)
IMM GRANULOCYTES # BLD AUTO: 0 K/UL (ref 0–0.5)
IMM GRANULOCYTES NFR BLD AUTO: 0 % (ref 0–5)
LYMPHOCYTES # BLD: 2.4 K/UL (ref 0.5–4.6)
LYMPHOCYTES NFR BLD: 38 % (ref 13–44)
MCH RBC QN AUTO: 29.3 PG (ref 26.1–32.9)
MCHC RBC AUTO-ENTMCNC: 32.9 G/DL (ref 31.4–35)
MCV RBC AUTO: 89.1 FL (ref 82–102)
MONOCYTES # BLD: 0.5 K/UL (ref 0.1–1.3)
MONOCYTES NFR BLD: 8 % (ref 4–12)
NEUTS SEG # BLD: 3.1 K/UL (ref 1.7–8.2)
NEUTS SEG NFR BLD: 49 % (ref 43–78)
NRBC # BLD: 0 K/UL (ref 0–0.2)
PLATELET # BLD AUTO: 246 K/UL (ref 150–450)
PMV BLD AUTO: 10 FL (ref 9.4–12.3)
POTASSIUM SERPL-SCNC: 4.4 MMOL/L (ref 3.5–5.1)
PROT SERPL-MCNC: 7.2 G/DL (ref 6.3–8.2)
RBC # BLD AUTO: 5.05 M/UL (ref 4.23–5.6)
SODIUM SERPL-SCNC: 138 MMOL/L (ref 136–146)
WBC # BLD AUTO: 6.3 K/UL (ref 4.3–11.1)

## 2024-01-23 ENCOUNTER — OFFICE VISIT (OUTPATIENT)
Dept: INTERNAL MEDICINE CLINIC | Facility: CLINIC | Age: 39
End: 2024-01-23
Payer: COMMERCIAL

## 2024-01-23 VITALS
WEIGHT: 165 LBS | DIASTOLIC BLOOD PRESSURE: 84 MMHG | HEART RATE: 74 BPM | OXYGEN SATURATION: 98 % | BODY MASS INDEX: 25.01 KG/M2 | SYSTOLIC BLOOD PRESSURE: 128 MMHG | HEIGHT: 68 IN

## 2024-01-23 DIAGNOSIS — R73.01 IMPAIRED FASTING GLUCOSE: Primary | ICD-10-CM

## 2024-01-23 DIAGNOSIS — L73.1 INGROWN HAIR: ICD-10-CM

## 2024-01-23 DIAGNOSIS — E55.9 VITAMIN D DEFICIENCY, UNSPECIFIED: ICD-10-CM

## 2024-01-23 DIAGNOSIS — R01.1 MURMUR, CARDIAC: ICD-10-CM

## 2024-01-23 DIAGNOSIS — R53.83 FATIGUE, UNSPECIFIED TYPE: ICD-10-CM

## 2024-01-23 DIAGNOSIS — H69.82 EUSTACHIAN TUBE DYSFUNCTION, LEFT: ICD-10-CM

## 2024-01-23 DIAGNOSIS — F39 UNSPECIFIED MOOD (AFFECTIVE) DISORDER (HCC): ICD-10-CM

## 2024-01-23 PROCEDURE — 99214 OFFICE O/P EST MOD 30 MIN: CPT | Performed by: INTERNAL MEDICINE

## 2024-01-23 RX ORDER — FLUTICASONE PROPIONATE 50 MCG
2 SPRAY, SUSPENSION (ML) NASAL DAILY
Qty: 16 G | Refills: 0 | Status: SHIPPED | OUTPATIENT
Start: 2024-01-23

## 2024-01-23 NOTE — PROGRESS NOTES
1/23/2024    Chief Complaint   Patient presents with    Follow-up Chronic Condition       Assessment and plan     1. Impaired fasting glucose  2. Vitamin D deficiency, unspecified  Comments:  recent vitamin D is normal  3. Unspecified mood (affective) disorder (HCC)  4. Eustachian tube dysfunction, left  Comments:  Can also use OTC Allegra D, Zyrtec D or ClaritinD or sudafed  Orders:  -     fluticasone (FLONASE) 50 MCG/ACT nasal spray; 2 sprays by Each Nostril route daily, Disp-16 g, R-0Normal  5. Fatigue, unspecified type  Comments:  better  6. Murmur, cardiac  Comments:  Chronic conditon . Stable  7. Ingrown hair  Comments:  right side of neck with associated non-infected comedone. discussed skin care     Patient seems to be doing well.  No evidence for diabetes mellitus   Mood seems to be good.      Follow up   Return in about 6 months (around 7/23/2024) for Chronic visit follow up, Fasting labs one week prior.         Subjective           HPI :  Patient is here to follow-up on chronic medical conditions.    Impaired fasting glucose  Doing well.  No symptoms suggestive of diabetes mellitus.  Compliant with recommendations.  Hemoglobin A1C   Date Value Ref Range Status   01/16/2024 5.8 (H) 4.8 - 5.6 % Final     Lab Results   Component Value Date     01/16/2024    K 4.4 01/16/2024     01/16/2024    CO2 26 01/16/2024    BUN 25 (H) 01/16/2024    CREATININE 1.30 01/16/2024    GLUCOSE 90 01/16/2024    CALCIUM 9.8 01/16/2024    PROT 7.2 01/16/2024    LABALBU 4.0 01/16/2024    BILITOT 0.5 01/16/2024    ALKPHOS 69 01/16/2024    AST 17 01/16/2024    ALT 33 01/16/2024    LABGLOM >60 01/16/2024    GFRAA >60 08/10/2022    AGRATIO 1.3 01/16/2024    GLOB 3.2 01/16/2024       Mood disorder  Doing well.  No new problems.    History of vitamin D deficiency.  Recent vitamin D level:  Lab Results   Component Value Date/Time    VITD25 67.2 01/16/2024 07:47 AM      Ear problem  Complains of hearing on the left side is

## 2024-04-05 ENCOUNTER — APPOINTMENT (RX ONLY)
Dept: URBAN - METROPOLITAN AREA CLINIC 330 | Facility: CLINIC | Age: 39
Setting detail: DERMATOLOGY
End: 2024-04-05

## 2024-04-05 DIAGNOSIS — L72.0 EPIDERMAL CYST: ICD-10-CM

## 2024-04-05 PROCEDURE — ? COUNSELING

## 2024-04-05 PROCEDURE — ? CONSULTATION EXCISION

## 2024-04-05 PROCEDURE — ? PHOTO-DOCUMENTATION

## 2024-04-05 PROCEDURE — 99212 OFFICE O/P EST SF 10 MIN: CPT

## 2024-04-05 ASSESSMENT — LOCATION ZONE DERM: LOCATION ZONE: SCALP

## 2024-04-05 ASSESSMENT — LOCATION SIMPLE DESCRIPTION DERM: LOCATION SIMPLE: SCALP

## 2024-04-05 ASSESSMENT — LOCATION DETAILED DESCRIPTION DERM: LOCATION DETAILED: RIGHT INFERIOR POSTAURICULAR SKIN

## 2024-04-05 NOTE — PROCEDURE: CONSULTATION EXCISION
Detail Level: Detailed
X Size Of Lesion In Cm (Optional): 0
Size Of Lesion: 1.6
Other Plan: Will refer pt to Danilo pereyra at the [facility]

## 2024-07-24 ENCOUNTER — APPOINTMENT (RX ONLY)
Dept: URBAN - METROPOLITAN AREA CLINIC 329 | Facility: CLINIC | Age: 39
Setting detail: DERMATOLOGY
End: 2024-07-24

## 2024-07-24 DIAGNOSIS — L72.0 EPIDERMAL CYST: ICD-10-CM | Status: STABLE

## 2024-07-24 PROCEDURE — 12041 INTMD RPR N-HF/GENIT 2.5CM/<: CPT

## 2024-07-24 PROCEDURE — ? COUNSELING

## 2024-07-24 PROCEDURE — ? EXCISION

## 2024-07-24 PROCEDURE — 11422 EXC H-F-NK-SP B9+MARG 1.1-2: CPT

## 2024-07-24 PROCEDURE — ? FULL BODY SKIN EXAM - DECLINED

## 2024-07-24 ASSESSMENT — LOCATION SIMPLE DESCRIPTION DERM: LOCATION SIMPLE: NECK

## 2024-07-24 ASSESSMENT — LOCATION DETAILED DESCRIPTION DERM: LOCATION DETAILED: RIGHT SUPERIOR LATERAL NECK

## 2024-07-24 ASSESSMENT — LOCATION ZONE DERM: LOCATION ZONE: NECK

## 2024-07-24 NOTE — PROCEDURE: EXCISION

## 2024-08-01 ENCOUNTER — APPOINTMENT (RX ONLY)
Dept: URBAN - METROPOLITAN AREA CLINIC 329 | Facility: CLINIC | Age: 39
Setting detail: DERMATOLOGY
End: 2024-08-01

## 2024-08-01 DIAGNOSIS — Z48.02 ENCOUNTER FOR REMOVAL OF SUTURES: ICD-10-CM

## 2024-08-01 PROCEDURE — ? SUTURE REMOVAL (GLOBAL PERIOD)

## 2024-08-01 PROCEDURE — 99024 POSTOP FOLLOW-UP VISIT: CPT

## 2024-08-01 PROCEDURE — ? COUNSELING

## 2024-08-01 ASSESSMENT — LOCATION DETAILED DESCRIPTION DERM: LOCATION DETAILED: RIGHT SUPERIOR LATERAL NECK

## 2024-08-01 ASSESSMENT — LOCATION SIMPLE DESCRIPTION DERM: LOCATION SIMPLE: NECK

## 2024-08-01 ASSESSMENT — LOCATION ZONE DERM: LOCATION ZONE: NECK

## 2024-08-01 NOTE — PROCEDURE: SUTURE REMOVAL (GLOBAL PERIOD)
Detail Level: Detailed
Add 83272 Cpt? (Important Note: In 2017 The Use Of 86182 Is Being Tracked By Cms To Determine Future Global Period Reimbursement For Global Periods): yes

## 2024-08-05 ENCOUNTER — OFFICE VISIT (OUTPATIENT)
Dept: INTERNAL MEDICINE CLINIC | Facility: CLINIC | Age: 39
End: 2024-08-05
Payer: COMMERCIAL

## 2024-08-05 VITALS
OXYGEN SATURATION: 99 % | WEIGHT: 177 LBS | BODY MASS INDEX: 26.83 KG/M2 | HEIGHT: 68 IN | TEMPERATURE: 97.7 F | HEART RATE: 52 BPM | SYSTOLIC BLOOD PRESSURE: 138 MMHG | DIASTOLIC BLOOD PRESSURE: 80 MMHG

## 2024-08-05 DIAGNOSIS — K30 INDIGESTION: ICD-10-CM

## 2024-08-05 DIAGNOSIS — R73.01 IMPAIRED FASTING GLUCOSE: ICD-10-CM

## 2024-08-05 DIAGNOSIS — E55.9 VITAMIN D DEFICIENCY, UNSPECIFIED: Primary | ICD-10-CM

## 2024-08-05 PROCEDURE — 99213 OFFICE O/P EST LOW 20 MIN: CPT | Performed by: INTERNAL MEDICINE

## 2024-08-05 SDOH — ECONOMIC STABILITY: FOOD INSECURITY: WITHIN THE PAST 12 MONTHS, YOU WORRIED THAT YOUR FOOD WOULD RUN OUT BEFORE YOU GOT MONEY TO BUY MORE.: NEVER TRUE

## 2024-08-05 SDOH — ECONOMIC STABILITY: INCOME INSECURITY: HOW HARD IS IT FOR YOU TO PAY FOR THE VERY BASICS LIKE FOOD, HOUSING, MEDICAL CARE, AND HEATING?: NOT VERY HARD

## 2024-08-05 SDOH — ECONOMIC STABILITY: FOOD INSECURITY: WITHIN THE PAST 12 MONTHS, THE FOOD YOU BOUGHT JUST DIDN'T LAST AND YOU DIDN'T HAVE MONEY TO GET MORE.: NEVER TRUE

## 2024-08-05 ASSESSMENT — PATIENT HEALTH QUESTIONNAIRE - PHQ9
SUM OF ALL RESPONSES TO PHQ QUESTIONS 1-9: 0
2. FEELING DOWN, DEPRESSED OR HOPELESS: NOT AT ALL
1. LITTLE INTEREST OR PLEASURE IN DOING THINGS: NOT AT ALL
SUM OF ALL RESPONSES TO PHQ QUESTIONS 1-9: 0
SUM OF ALL RESPONSES TO PHQ9 QUESTIONS 1 & 2: 0

## 2024-08-05 NOTE — PATIENT INSTRUCTIONS
Lifestyle recommendations   :  Balanced diet Rich in plant proteins,  whole grains, vegetables and fruit. low in carbohydrates and added sugars.  Low salt/DASH diet recommended for HTN. Goal for treatment is less than 130/80mmHg.   Recommend low fat low cholesterol diet .  Mediterranean/heart healthy diet.    Reduce use  of saturated fat, trans fat and cholesterol.  EXERCISE : At least 150 min /week of moderate intensity aerobic activity spread over more than 3 days, with not  more than 2 consecutive days without exercise      Patient Education        Resistance Training With Free Weights: Exercises  Introduction  Here are some examples of exercises for resistance training. Start each exercise slowly. Ease off the exercise if you start to have pain.  Your doctor or physical therapist will tell you when you can start these exercises and which ones will work best for you.  How to do the exercises  Chest fly (lying down)    Lie on a bench or exercise ball, and hold light weights straight up over your chest. You can also use soup cans or filled water bottles for weights. Do not lock your elbows. You can keep them slightly bent if that is more comfortable.  Slowly lower your arms, keeping them extended, until the weights are level with your chest or slightly lower.  Slowly raise your arms until you are in the starting position.  Repeat 8 to 12 times.  Arm raise to the side (elbows bent)    Stand with your feet shoulder-width apart and your knees slightly bent. Or sit up straight in a chair.  Hold a 1- to 2-pound weight in each hand. The weight may be a dumbbell, a can of food, or a filled water bottle.  Bend your elbows 90 degrees while keeping them at your sides. With your palms facing in, hold the weights straight in front of you.  Slowly lift the weights and your elbows out to the sides to shoulder level, keeping your elbows bent. Keep your shoulders down and relaxed as you lift. If you find that you are shrugging

## 2024-08-05 NOTE — PROGRESS NOTES
depending what he is eating.    Interim History   Cyst removed by dermatology .  Changed his job.  Working for a different company.   by profession    Review of Systems   \"Sleeping is  ok . Tends to feel hot at night - keeps room around 68 - 70 degrees C.       Objective     Examination  /80 (Site: Left Upper Arm, Position: Sitting, Cuff Size: Medium Adult)   Pulse 52   Temp 97.7 °F (36.5 °C) (Temporal)   Ht 1.727 m (5' 8\")   Wt 80.3 kg (177 lb)   SpO2 99%   BMI 26.91 kg/m²     Wt Readings from Last 3 Encounters:   08/05/24 80.3 kg (177 lb)   01/23/24 74.8 kg (165 lb)   09/18/23 79.2 kg (174 lb 8 oz)     At home he weighs 167.8 lbs.      Physical Exam  General appearance:Well looking , No distress Alert and oriented X 3. Well nourished and well hydrated  Head: Normocephalic, atraumatic  Eyes: conjunctivae clear.   Neck: Supple, No carotid bruit, no thyromegaly, no adenopathy  Resp: normal effort. Chest clear  Heart: RRR. Normal heart sounds .   Abdomen: Soft, non-tender, no masses , no organomegaly.  Normal bowel sounds  Extremities: No edema  Neurology: no Focal deficits  Psychology: normal affect. Mood is normal     My Fuentes MD FACP

## 2024-08-21 DIAGNOSIS — E55.9 VITAMIN D DEFICIENCY, UNSPECIFIED: ICD-10-CM

## 2024-08-21 DIAGNOSIS — R73.01 IMPAIRED FASTING GLUCOSE: ICD-10-CM

## 2024-08-21 LAB
25(OH)D3 SERPL-MCNC: 54.8 NG/ML (ref 30–100)
ALBUMIN SERPL-MCNC: 4.4 G/DL (ref 3.5–5)
ALBUMIN/GLOB SERPL: 1.4 (ref 1–1.9)
ALP SERPL-CCNC: 68 U/L (ref 40–129)
ALT SERPL-CCNC: 42 U/L (ref 12–65)
ANION GAP SERPL CALC-SCNC: 13 MMOL/L (ref 9–18)
AST SERPL-CCNC: 29 U/L (ref 15–37)
BASOPHILS # BLD: 0.1 K/UL (ref 0–0.2)
BASOPHILS NFR BLD: 1 % (ref 0–2)
BILIRUB SERPL-MCNC: 0.6 MG/DL (ref 0–1.2)
BUN SERPL-MCNC: 20 MG/DL (ref 6–23)
CALCIUM SERPL-MCNC: 10 MG/DL (ref 8.8–10.2)
CHLORIDE SERPL-SCNC: 102 MMOL/L (ref 98–107)
CO2 SERPL-SCNC: 26 MMOL/L (ref 20–28)
CREAT SERPL-MCNC: 1.33 MG/DL (ref 0.8–1.3)
DIFFERENTIAL METHOD BLD: NORMAL
EOSINOPHIL # BLD: 0.2 K/UL (ref 0–0.8)
EOSINOPHIL NFR BLD: 3 % (ref 0.5–7.8)
ERYTHROCYTE [DISTWIDTH] IN BLOOD BY AUTOMATED COUNT: 12.3 % (ref 11.9–14.6)
EST. AVERAGE GLUCOSE BLD GHB EST-MCNC: 124 MG/DL
GLOBULIN SER CALC-MCNC: 3.2 G/DL (ref 2.3–3.5)
GLUCOSE SERPL-MCNC: 91 MG/DL (ref 70–99)
HBA1C MFR BLD: 6 % (ref 0–5.6)
HCT VFR BLD AUTO: 49.9 % (ref 41.1–50.3)
HGB BLD-MCNC: 16.2 G/DL (ref 13.6–17.2)
IMM GRANULOCYTES # BLD AUTO: 0.1 K/UL (ref 0–0.5)
IMM GRANULOCYTES NFR BLD AUTO: 1 % (ref 0–5)
LYMPHOCYTES # BLD: 2.6 K/UL (ref 0.5–4.6)
LYMPHOCYTES NFR BLD: 37 % (ref 13–44)
MCH RBC QN AUTO: 29.2 PG (ref 26.1–32.9)
MCHC RBC AUTO-ENTMCNC: 32.5 G/DL (ref 31.4–35)
MCV RBC AUTO: 90.1 FL (ref 82–102)
MONOCYTES # BLD: 0.6 K/UL (ref 0.1–1.3)
MONOCYTES NFR BLD: 9 % (ref 4–12)
NEUTS SEG # BLD: 3.4 K/UL (ref 1.7–8.2)
NEUTS SEG NFR BLD: 49 % (ref 43–78)
NRBC # BLD: 0 K/UL (ref 0–0.2)
PLATELET # BLD AUTO: 274 K/UL (ref 150–450)
PMV BLD AUTO: 10 FL (ref 9.4–12.3)
POTASSIUM SERPL-SCNC: 4.6 MMOL/L (ref 3.5–5.1)
PROT SERPL-MCNC: 7.6 G/DL (ref 6.3–8.2)
RBC # BLD AUTO: 5.54 M/UL (ref 4.23–5.6)
SODIUM SERPL-SCNC: 141 MMOL/L (ref 136–145)
WBC # BLD AUTO: 7 K/UL (ref 4.3–11.1)

## 2024-10-18 ENCOUNTER — OFFICE VISIT (OUTPATIENT)
Dept: INTERNAL MEDICINE CLINIC | Facility: CLINIC | Age: 39
End: 2024-10-18
Payer: COMMERCIAL

## 2024-10-18 VITALS
RESPIRATION RATE: 16 BRPM | HEART RATE: 53 BPM | BODY MASS INDEX: 26.98 KG/M2 | HEIGHT: 68 IN | SYSTOLIC BLOOD PRESSURE: 131 MMHG | OXYGEN SATURATION: 98 % | WEIGHT: 178 LBS | DIASTOLIC BLOOD PRESSURE: 84 MMHG

## 2024-10-18 DIAGNOSIS — H92.03 EAR PAIN, BILATERAL: Primary | ICD-10-CM

## 2024-10-18 PROCEDURE — 99213 OFFICE O/P EST LOW 20 MIN: CPT | Performed by: INTERNAL MEDICINE

## 2024-10-18 RX ORDER — PSEUDOEPHEDRINE HCL 30 MG
30 TABLET ORAL EVERY 6 HOURS PRN
Qty: 40 TABLET | Refills: 0 | Status: SHIPPED | OUTPATIENT
Start: 2024-10-18 | End: 2024-10-28

## 2024-10-18 RX ORDER — METHYLPREDNISOLONE 4 MG
4 TABLET, DOSE PACK ORAL SEE ADMIN INSTRUCTIONS
Qty: 5 TABLET | Refills: 0 | Status: SHIPPED | OUTPATIENT
Start: 2024-10-18 | End: 2024-10-23

## 2024-10-18 NOTE — PROGRESS NOTES
Status: He is alert.           Lab Results   Component Value Date     08/21/2024    K 4.6 08/21/2024     08/21/2024    CO2 26 08/21/2024    BUN 20 08/21/2024    CREATININE 1.33 (H) 08/21/2024    GLUCOSE 91 08/21/2024    CALCIUM 10.0 08/21/2024    BILITOT 0.6 08/21/2024    ALKPHOS 68 08/21/2024    AST 29 08/21/2024    ALT 42 08/21/2024    LABGLOM 71 08/21/2024    GFRAA >60 08/10/2022    AGRATIO 2.0 02/17/2022    GLOB 3.2 08/21/2024     Lab Results   Component Value Date    WBC 7.0 08/21/2024    HGB 16.2 08/21/2024    HCT 49.9 08/21/2024    MCV 90.1 08/21/2024     08/21/2024    LYMPHOPCT 37 08/21/2024    RBC 5.54 08/21/2024    MCH 29.2 08/21/2024    MCHC 32.5 08/21/2024    RDW 12.3 08/21/2024     Lab Results   Component Value Date    CHOL 116 08/03/2020    TRIG 77 08/03/2020    HDL 43 08/03/2020    LDL 58 08/03/2020    VLDL 15 08/03/2020     Hemoglobin A1C   Date Value Ref Range Status   08/21/2024 6.0 (H) 0 - 5.6 % Final     Comment:     Reference Range  Normal       <5.7%  Prediabetes  5.7-6.4%  Diabetes     >6.4%     01/16/2024 5.8 (H) 4.8 - 5.6 % Final   04/27/2023 5.7 (H) 4.8 - 5.6 % Final     Lab Results   Component Value Date    TSH 0.757 08/10/2022       Assessment & Plan    1. Ear pain, bilateral    -This may be allergy related inflammation/fluid, will try sudafed and medrol.  Refer to ENT as well.   - No evidence of infection on exam.    - methylPREDNISolone (MEDROL DOSEPACK) 4 MG tablet; Take 1 tablet by mouth See Admin Instructions for 5 days Take by mouth.  Dispense: 5 tablet; Refill: 0  - pseudoephedrine (DECONGESTANT) 30 MG tablet; Take 1 tablet by mouth every 6 hours as needed for Congestion  Dispense: 40 tablet; Refill: 0  - [facility] [place]        Return if symptoms worsen or fail to improve.    -- Eileen Spain MD

## 2024-11-11 ENCOUNTER — OFFICE VISIT (OUTPATIENT)
Dept: ENT CLINIC | Age: 39
End: 2024-11-11
Payer: COMMERCIAL

## 2024-11-11 VITALS
RESPIRATION RATE: 17 BRPM | WEIGHT: 179 LBS | BODY MASS INDEX: 27.13 KG/M2 | HEIGHT: 68 IN | DIASTOLIC BLOOD PRESSURE: 82 MMHG | SYSTOLIC BLOOD PRESSURE: 118 MMHG

## 2024-11-11 DIAGNOSIS — H93.8X3 EAR PRESSURE, BILATERAL: ICD-10-CM

## 2024-11-11 DIAGNOSIS — H92.01 OTALGIA, RIGHT: Primary | ICD-10-CM

## 2024-11-11 DIAGNOSIS — J03.90 TONSILLITIS: ICD-10-CM

## 2024-11-11 PROCEDURE — 92504 EAR MICROSCOPY EXAMINATION: CPT | Performed by: STUDENT IN AN ORGANIZED HEALTH CARE EDUCATION/TRAINING PROGRAM

## 2024-11-11 PROCEDURE — 99204 OFFICE O/P NEW MOD 45 MIN: CPT | Performed by: STUDENT IN AN ORGANIZED HEALTH CARE EDUCATION/TRAINING PROGRAM

## 2024-11-11 RX ORDER — PREDNISONE 20 MG/1
TABLET ORAL
Qty: 16 TABLET | Refills: 0 | Status: SHIPPED | OUTPATIENT
Start: 2024-11-11

## 2024-11-11 RX ORDER — CLINDAMYCIN HYDROCHLORIDE 300 MG/1
300 CAPSULE ORAL 3 TIMES DAILY
Qty: 21 CAPSULE | Refills: 0 | Status: SHIPPED | OUTPATIENT
Start: 2024-11-11 | End: 2024-11-18

## 2024-11-11 ASSESSMENT — ENCOUNTER SYMPTOMS
SINUS PRESSURE: 0
COUGH: 0
EYE ITCHING: 0
EYE DISCHARGE: 0
DIARRHEA: 0
WHEEZING: 0
NAUSEA: 0
FACIAL SWELLING: 0
CONSTIPATION: 0
SHORTNESS OF BREATH: 0
CHOKING: 0
EYE PAIN: 0
APNEA: 0
STRIDOR: 0
SINUS PAIN: 0

## 2024-11-11 NOTE — PROGRESS NOTES
HPI:  Jose L Marinelli is a 39 y.o. male seen New    Chief Complaint   Patient presents with    Ear Problem     Patient presents today with c/o ear pain that he attributes to recent ear infection x 2 months. Patient states that both ears are painful R>L . He states that he has taken oral abx , steroid , nasal spray , and oral steroid . Patient notes that he flies frequently .      39-year-old male seen as a new patient referral evaluation having concerns of otalgia.  For 2 months he has had right greater than left earache which has been quite painful at times.  He has seen his PCP and has had first amoxicillin and Flonase and then later neomycin eardrops and then again followed up with prednisone taper.  Today the only thing that was helpful was prednisone which was temporary and now having reexacerbation of pain once again.  He will feel a fluid type sensation.  There is right ear almost feels as if it is muffled with a hearing loss.  There has been a couple brief moments of dizziness or disequilibrium.  He also flies frequently a couple times per week with his job and notices his symptoms have exacerbated during recent flights.    Past Medical History, Past Surgical History, Family history, Social History, and Medications were all reviewed with the patient today and updated as necessary.     No Known Allergies    Patient Active Problem List   Diagnosis    Impaired fasting glucose    Enlarged tonsils    Trace mitral regurgitation by prior echocardiogram    Acute allergic rhinitis    Murmur, cardiac    Acne vulgaris    Trace tricuspid regurgitation by prior echocardiogram    Unspecified mood (affective) disorder (HCC)       Current Outpatient Medications   Medication Sig    predniSONE (DELTASONE) 20 MG tablet 60 mg (3 tabs) PO once daily for 3 days; 40 mg (2 tabs) for 2 days; 20 mg (1 tab) for 2 days; 10 mg (1/2 tab) for 2 days    clindamycin (CLEOCIN) 300 MG capsule Take 1 capsule by mouth 3 times daily for 7

## 2024-12-10 ENCOUNTER — OFFICE VISIT (OUTPATIENT)
Dept: INTERNAL MEDICINE CLINIC | Facility: CLINIC | Age: 39
End: 2024-12-10
Payer: COMMERCIAL

## 2024-12-10 VITALS
DIASTOLIC BLOOD PRESSURE: 89 MMHG | HEART RATE: 52 BPM | OXYGEN SATURATION: 100 % | SYSTOLIC BLOOD PRESSURE: 132 MMHG | BODY MASS INDEX: 27.01 KG/M2 | RESPIRATION RATE: 16 BRPM | WEIGHT: 178.2 LBS | HEIGHT: 68 IN | TEMPERATURE: 97.7 F

## 2024-12-10 DIAGNOSIS — Z00.00 ENCOUNTER FOR WELL ADULT EXAM WITHOUT ABNORMAL FINDINGS: Primary | ICD-10-CM

## 2024-12-10 DIAGNOSIS — Z13.220 ENCOUNTER FOR SCREENING FOR LIPID DISORDER: ICD-10-CM

## 2024-12-10 DIAGNOSIS — Z13.29 SCREENING FOR THYROID DISORDER: ICD-10-CM

## 2024-12-10 DIAGNOSIS — Z13.9 SCREENING FOR CONDITION: ICD-10-CM

## 2024-12-10 PROCEDURE — 99395 PREV VISIT EST AGE 18-39: CPT | Performed by: INTERNAL MEDICINE

## 2024-12-10 NOTE — PROGRESS NOTES
Well Adult Note  Name: Jose L Marinelli Today’s Date: 12/10/2024   MRN: [id number] Sex: Male   Age: 39 y.o. Ethnicity: Non- / Non    : 1985 Race: Black /       Jose L Marinelli is here for a well adult exam.       Assessment & Plan     Encounter for well adult exam without abnormal findings  -     CBC with Auto Differential; Future  Encounter for screening for lipid disorder  -     Lipid Panel; Future  Screening for condition  -     Urinalysis with Reflex to Culture; Future  -     CBC with Auto Differential; Future  Screening for thyroid disorder  -     TSH; Future        Advised lifestyle changes:   Keep well balanced diet rich in plant proteins, grains, fruits and vegetables, get at least 6-8 hrs of sleep a night. Limit Carbohydrates  EXERCISE : At least 150 min /week of moderate intensity aerobic activity spread over more than 3 days, with not  more than 2 consecutive days without exercise       Return in about 6 months (around 6/10/2025) for Chronic visit follow up, EOV, Fasting labs one week prior.       Subjective   History:  Patient is a 39-year-old male here for his annual physical exam.  He is doing well.  Has no new concerns.    Health Maintenance  Last Tetanus vaccine: 2016  Covid 19 Vaccine :   Last Flu shot:   Last eye exam : OK - no glaucoma       Review of Systems   Constitutional: Negative.    HENT: Negative.     Eyes: Negative.    Respiratory:  Negative for cough and shortness of breath.    Cardiovascular:  Negative for chest pain, palpitations and leg swelling.   Gastrointestinal: Negative.    Genitourinary: Negative.    Musculoskeletal: Negative.    Neurological:  Negative for dizziness, weakness, light-headedness and headaches.   Psychiatric/Behavioral: Negative.       No Known Allergies  Prior to Visit Medications    Medication Sig Taking? Authorizing Provider   fluticasone (FLONASE) 50 MCG/ACT nasal spray 2 sprays by Each Nostril route

## 2025-02-18 ENCOUNTER — TELEPHONE (OUTPATIENT)
Dept: INTERNAL MEDICINE CLINIC | Facility: CLINIC | Age: 40
End: 2025-02-18

## 2025-03-24 ENCOUNTER — OFFICE VISIT (OUTPATIENT)
Dept: INTERNAL MEDICINE CLINIC | Facility: CLINIC | Age: 40
End: 2025-03-24
Payer: COMMERCIAL

## 2025-03-24 VITALS
SYSTOLIC BLOOD PRESSURE: 130 MMHG | BODY MASS INDEX: 26.22 KG/M2 | HEIGHT: 68 IN | DIASTOLIC BLOOD PRESSURE: 88 MMHG | HEART RATE: 50 BPM | WEIGHT: 173 LBS | TEMPERATURE: 97.8 F | OXYGEN SATURATION: 98 %

## 2025-03-24 DIAGNOSIS — R73.03 PREDIABETES: ICD-10-CM

## 2025-03-24 DIAGNOSIS — Z13.29 SCREENING FOR THYROID DISORDER: ICD-10-CM

## 2025-03-24 DIAGNOSIS — E55.9 VITAMIN D DEFICIENCY: ICD-10-CM

## 2025-03-24 DIAGNOSIS — Z76.89 ENCOUNTER TO ESTABLISH CARE WITH NEW DOCTOR: ICD-10-CM

## 2025-03-24 DIAGNOSIS — R53.83 OTHER FATIGUE: Primary | ICD-10-CM

## 2025-03-24 DIAGNOSIS — N52.9 ERECTILE DYSFUNCTION, UNSPECIFIED ERECTILE DYSFUNCTION TYPE: ICD-10-CM

## 2025-03-24 DIAGNOSIS — Z13.220 ENCOUNTER FOR SCREENING FOR LIPID DISORDER: ICD-10-CM

## 2025-03-24 PROCEDURE — 99214 OFFICE O/P EST MOD 30 MIN: CPT | Performed by: FAMILY MEDICINE

## 2025-03-24 SDOH — ECONOMIC STABILITY: FOOD INSECURITY: WITHIN THE PAST 12 MONTHS, THE FOOD YOU BOUGHT JUST DIDN'T LAST AND YOU DIDN'T HAVE MONEY TO GET MORE.: NEVER TRUE

## 2025-03-24 SDOH — ECONOMIC STABILITY: FOOD INSECURITY: WITHIN THE PAST 12 MONTHS, YOU WORRIED THAT YOUR FOOD WOULD RUN OUT BEFORE YOU GOT MONEY TO BUY MORE.: NEVER TRUE

## 2025-03-24 ASSESSMENT — PATIENT HEALTH QUESTIONNAIRE - PHQ9
2. FEELING DOWN, DEPRESSED OR HOPELESS: NOT AT ALL
SUM OF ALL RESPONSES TO PHQ QUESTIONS 1-9: 0
SUM OF ALL RESPONSES TO PHQ QUESTIONS 1-9: 0
1. LITTLE INTEREST OR PLEASURE IN DOING THINGS: NOT AT ALL
SUM OF ALL RESPONSES TO PHQ QUESTIONS 1-9: 0
SUM OF ALL RESPONSES TO PHQ QUESTIONS 1-9: 0

## 2025-03-24 NOTE — PROGRESS NOTES
Jackeline Garcia,   [facility] [place] [address]  Phone [phone]  Fax [phone]      Jose L Marinelli (: 1985) is a 39 y.o. male, here for evaluation of the following chief complaint(s):  Established New Doctor (Last physical done 12/10/24 and eye exam twice yearly ) and OTHER (Concern about testosterone levels, feeling fatigue )       ASSESSMENT/PLAN:  1. Other fatigue  -     Testosterone, free, total; Future  -     Vitamin D 25 Hydroxy; Future  -     Hemoglobin A1C; Future  -     TSH; Future  -     CBC; Future  -     Comprehensive Metabolic Panel; Future  -     Comprehensive Metabolic Panel; Future  -     CBC with Auto Differential; Future  2. Erectile dysfunction, unspecified erectile dysfunction type  -     Testosterone, free, total; Future  3. Prediabetes  -     Hemoglobin A1C; Future  -     CBC; Future  -     Comprehensive Metabolic Panel; Future  -     Lipid Panel; Future  -     Comprehensive Metabolic Panel; Future  -     CBC with Auto Differential; Future  -     Hemoglobin A1C; Future  4. Vitamin D deficiency  -     Vitamin D 25 Hydroxy; Future  -     Vitamin D 25 Hydroxy; Future  5. Screening for thyroid disorder  -     TSH; Future  6. Encounter for screening for lipid disorder  -     Lipid Panel; Future  7. Encounter to establish care with new doctor    OTC supplements - Mg + melatonin discussed for patient for sleep, along with sleep hygiene.   Unknown coverage informed. Patient requested and agreed to these lab testing.  All questions and concerns answered. Patient voiced understanding and agrees with POC.    FOLLOW UP:  Return in about 6 months (around 2025) for Physical, lab appointment prior (cancel the ) + lab soon.    SUBJECTIVE/OBJECTIVE:  HPI: Patient is a 40 yo male who presents today for follow up with new provider.  He presents with the chief complaint of low energy levels and fatigue, which he has been

## 2025-04-22 DIAGNOSIS — E55.9 VITAMIN D DEFICIENCY: ICD-10-CM

## 2025-04-22 DIAGNOSIS — R53.83 OTHER FATIGUE: ICD-10-CM

## 2025-04-22 DIAGNOSIS — Z13.220 ENCOUNTER FOR SCREENING FOR LIPID DISORDER: ICD-10-CM

## 2025-04-22 DIAGNOSIS — N52.9 ERECTILE DYSFUNCTION, UNSPECIFIED ERECTILE DYSFUNCTION TYPE: ICD-10-CM

## 2025-04-22 DIAGNOSIS — R73.03 PREDIABETES: ICD-10-CM

## 2025-04-22 DIAGNOSIS — Z13.29 SCREENING FOR THYROID DISORDER: ICD-10-CM

## 2025-04-22 LAB
25(OH)D3 SERPL-MCNC: 38.7 NG/ML (ref 30–100)
ALBUMIN SERPL-MCNC: 4.3 G/DL (ref 3.5–5)
ALBUMIN/GLOB SERPL: 1.3 (ref 1–1.9)
ALP SERPL-CCNC: 84 U/L (ref 40–129)
ALT SERPL-CCNC: 54 U/L (ref 8–55)
ANION GAP SERPL CALC-SCNC: 14 MMOL/L (ref 7–16)
AST SERPL-CCNC: 34 U/L (ref 15–37)
BILIRUB SERPL-MCNC: 0.2 MG/DL (ref 0–1.2)
BUN SERPL-MCNC: 21 MG/DL (ref 6–23)
CALCIUM SERPL-MCNC: 10.4 MG/DL (ref 8.8–10.2)
CHLORIDE SERPL-SCNC: 102 MMOL/L (ref 98–107)
CHOLEST SERPL-MCNC: 128 MG/DL (ref 0–200)
CO2 SERPL-SCNC: 25 MMOL/L (ref 20–29)
CREAT SERPL-MCNC: 1.21 MG/DL (ref 0.8–1.3)
ERYTHROCYTE [DISTWIDTH] IN BLOOD BY AUTOMATED COUNT: 12.9 % (ref 11.9–14.6)
EST. AVERAGE GLUCOSE BLD GHB EST-MCNC: 121 MG/DL
GLOBULIN SER CALC-MCNC: 3.3 G/DL (ref 2.3–3.5)
GLUCOSE SERPL-MCNC: 88 MG/DL (ref 70–99)
HBA1C MFR BLD: 5.8 % (ref 0–5.6)
HCT VFR BLD AUTO: 45.3 % (ref 41.1–50.3)
HDLC SERPL-MCNC: 48 MG/DL (ref 40–60)
HDLC SERPL: 2.7 (ref 0–5)
HGB BLD-MCNC: 15.6 G/DL (ref 13.6–17.2)
LDLC SERPL CALC-MCNC: 64 MG/DL (ref 0–100)
MCH RBC QN AUTO: 29.9 PG (ref 26.1–32.9)
MCHC RBC AUTO-ENTMCNC: 34.4 G/DL (ref 31.4–35)
MCV RBC AUTO: 86.9 FL (ref 82–102)
NRBC # BLD: 0 K/UL (ref 0–0.2)
PLATELET # BLD AUTO: 254 K/UL (ref 150–450)
PMV BLD AUTO: 10.4 FL (ref 9.4–12.3)
POTASSIUM SERPL-SCNC: 4.2 MMOL/L (ref 3.5–5.1)
PROT SERPL-MCNC: 7.6 G/DL (ref 6.3–8.2)
RBC # BLD AUTO: 5.21 M/UL (ref 4.23–5.6)
SODIUM SERPL-SCNC: 141 MMOL/L (ref 136–145)
TRIGL SERPL-MCNC: 81 MG/DL (ref 0–150)
TSH, 3RD GENERATION: 1.9 UIU/ML (ref 0.27–4.2)
VLDLC SERPL CALC-MCNC: 16 MG/DL (ref 6–23)
WBC # BLD AUTO: 8.2 K/UL (ref 4.3–11.1)

## 2025-04-24 LAB
TESTOST FREE SERPL-MCNC: 20.4 PG/ML (ref 8.7–25.1)
TESTOST SERPL-MCNC: 473 NG/DL (ref 264–916)

## 2025-05-06 ENCOUNTER — PATIENT MESSAGE (OUTPATIENT)
Dept: INTERNAL MEDICINE CLINIC | Facility: CLINIC | Age: 40
End: 2025-05-06